# Patient Record
Sex: MALE | Race: OTHER | NOT HISPANIC OR LATINO | URBAN - METROPOLITAN AREA
[De-identification: names, ages, dates, MRNs, and addresses within clinical notes are randomized per-mention and may not be internally consistent; named-entity substitution may affect disease eponyms.]

---

## 2017-10-15 ENCOUNTER — EMERGENCY (EMERGENCY)
Facility: HOSPITAL | Age: 47
LOS: 1 days | Discharge: HOME | End: 2017-10-15
Admitting: EMERGENCY MEDICINE
Payer: SELF-PAY

## 2017-10-15 VITALS
TEMPERATURE: 98 F | OXYGEN SATURATION: 99 % | DIASTOLIC BLOOD PRESSURE: 79 MMHG | SYSTOLIC BLOOD PRESSURE: 129 MMHG | HEART RATE: 78 BPM | RESPIRATION RATE: 18 BRPM

## 2017-10-15 PROCEDURE — 99284 EMERGENCY DEPT VISIT MOD MDM: CPT

## 2017-10-15 RX ORDER — DOCUSATE SODIUM 100 MG
1 CAPSULE ORAL
Qty: 14 | Refills: 0 | OUTPATIENT
Start: 2017-10-15 | End: 2017-10-22

## 2017-10-15 RX ORDER — HYDROCORTISONE/PRAMOXINE 2.5 %-1 %
1 CREAM WITH APPLICATOR RECTAL
Qty: 1 | Refills: 0 | OUTPATIENT
Start: 2017-10-15 | End: 2017-10-22

## 2017-10-15 RX ORDER — IBUPROFEN 200 MG
600 TABLET ORAL ONCE
Qty: 0 | Refills: 0 | Status: COMPLETED | OUTPATIENT
Start: 2017-10-15 | End: 2017-10-15

## 2017-10-15 RX ORDER — IBUPROFEN 200 MG
1 TABLET ORAL
Qty: 20 | Refills: 0 | OUTPATIENT
Start: 2017-10-15 | End: 2017-10-20

## 2017-10-15 RX ADMIN — Medication 600 MILLIGRAM(S): at 14:37

## 2017-10-15 NOTE — ED PROVIDER NOTE - MEDICAL DECISION MAKING DETAILS
46 y/o M with rectal pain s/p BM last night likely internal hemorrhoid- Analpram, Colase, Sitza baths, NSAIDS, GI follow up updated colonoscopy

## 2017-10-15 NOTE — ED ADULT TRIAGE NOTE - CHIEF COMPLAINT QUOTE
Patient brought to ER from home after having c/o pain and swelling to anus. Pt also c/o abdominal pain and distention that started today.

## 2017-10-15 NOTE — ED PROVIDER NOTE - PLAN OF CARE
Follow up with your PMD within 48-72 hrs. Use the cream 2x/day for 7 days. Take Motrin 600mg every 6-8hrs as needed for pain with food. Take the Colace 1 pill 2x/day as needed to keep your stool soft. Take a warm sitz bath 3-4 times/day with epsom salt for pain relief. Follow up with the Gastroenterologist within the week- referral list given. Worsening, continued or ANY new concerning symptoms return to the emergency department.

## 2017-10-15 NOTE — ED PROVIDER NOTE - OBJECTIVE STATEMENT
46 y/o M with h/o anxiety and depression presents to ED with rectal "swelling" since last night. States he had a BM of normal consistency and then started with the pain right after. Slept fine throughout the night, did not take anything for the pain. Awoke in the am and had another BM of normal consistency that was now painful. States he felt a "swelling and something sticking out" right after.  Denies h/o hemorrhoids, constipation. Denies nausea, vomiting, fever, chills, weakness, dysuria, melena, BRBPR. Appears very well. No distress. Contrary to triage note, no abdominal pain.

## 2017-10-15 NOTE — ED PROVIDER NOTE - CARE PLAN
Principal Discharge DX:	Rectal pain  Instructions for follow-up, activity and diet:	Follow up with your PMD within 48-72 hrs. Use the cream 2x/day for 7 days. Take Motrin 600mg every 6-8hrs as needed for pain with food. Take the Colace 1 pill 2x/day as needed to keep your stool soft. Take a warm sitz bath 3-4 times/day with epsom salt for pain relief. Follow up with the Gastroenterologist within the week- referral list given. Worsening, continued or ANY new concerning symptoms return to the emergency department.

## 2018-10-22 ENCOUNTER — EMERGENCY (EMERGENCY)
Facility: HOSPITAL | Age: 48
LOS: 1 days | Discharge: HOME | End: 2018-10-22
Attending: EMERGENCY MEDICINE | Admitting: EMERGENCY MEDICINE
Payer: MEDICARE

## 2018-10-22 VITALS
RESPIRATION RATE: 76 BRPM | DIASTOLIC BLOOD PRESSURE: 82 MMHG | HEART RATE: 99 BPM | OXYGEN SATURATION: 100 % | SYSTOLIC BLOOD PRESSURE: 130 MMHG | TEMPERATURE: 98 F

## 2018-10-22 PROCEDURE — 99283 EMERGENCY DEPT VISIT LOW MDM: CPT | Mod: GC

## 2018-10-22 RX ORDER — ACETAMINOPHEN 500 MG
975 TABLET ORAL ONCE
Qty: 0 | Refills: 0 | Status: COMPLETED | OUTPATIENT
Start: 2018-10-22 | End: 2018-10-22

## 2018-10-22 RX ORDER — OXYCODONE HYDROCHLORIDE 5 MG/1
5 TABLET ORAL ONCE
Qty: 0 | Refills: 0 | Status: DISCONTINUED | OUTPATIENT
Start: 2018-10-22 | End: 2018-10-22

## 2018-10-22 RX ADMIN — Medication 975 MILLIGRAM(S): at 23:34

## 2018-10-22 RX ADMIN — OXYCODONE HYDROCHLORIDE 5 MILLIGRAM(S): 5 TABLET ORAL at 23:34

## 2018-10-22 NOTE — ED PROVIDER NOTE - PHYSICAL EXAMINATION
Mago Rice, DO:   Gen: Well appearing, NAD  Head: NCAT  HEENT: PERRL, MMM, normal conjunctiva, anicteric, neck supple  Lung: CTAB, no adventitious sounds  CV: RRR, no murmurs  Abd: soft, NTND, no rebound or guarding  MSK: L subacromial TTP without edema, erythema or warmth, ROM limited by pain but joint with pROM. Guarding LUE extremity with ROM.   Neuro: 5/5 strength and normal sensation intact L C5-T1.   Skin: Warm and dry, no evidence of rash  Psych: normal mood and affect

## 2018-10-22 NOTE — ED PROVIDER NOTE - OBJECTIVE STATEMENT
Mago Rice, DO: 48M with hx HTN, HLD BIBEMS for L shoulder pain x 2 months, acutely worse since today. Pain worse with L ROM. Pt seen at [facility] with negative w/u, d/c with Meloxicam & Flexeril. Patient stating no relief with medications. Denies fever, rash. No recent trauma. Denies IVDU.

## 2018-10-22 NOTE — ED PROVIDER NOTE - ATTENDING CONTRIBUTION TO CARE
Locurto  pt presenting with lt shoulder pain  worse with movement for 2 mos intermittently  but worse over the last 1-2 days  he denies trauma or identifiable acute insult  Pain is intensified with attempted movement    exam  pt with clear lungs  card  RRR S1S2  strength nl in RUE and LE's  LUE  strength normal at hand, wrists  and elbow including intrinsics of hand  movement at rt shoulder intensifies pain  pt with limited ability to abduct or flex  but shoulder can be ranged passively  shoulder tender laterally  no warmth or edema, distal pulses intact    plain films negative  suspect rotator cuff pathology with decreased movement and localizable tenderness   mostly indolent course  will provide pain relief  will need ortho follow up

## 2018-10-22 NOTE — ED PROVIDER NOTE - CARE PLAN
Principal Discharge DX:	Shoulder pain Principal Discharge DX:	Shoulder pain  Assessment and plan of treatment:	Continue taking meloxican as prescribed.  Take percocet 5/325mg every 6-8 hours as needed for breakthrough pain.  Follow up with orthopedic within one week.  Return to ED for any worsening pain, redness, swelling or fever.

## 2018-10-22 NOTE — ED PROVIDER NOTE - PLAN OF CARE
Continue taking meloxican as prescribed.  Take percocet 5/325mg every 6-8 hours as needed for breakthrough pain.  Follow up with orthopedic within one week.  Return to ED for any worsening pain, redness, swelling or fever.

## 2018-10-22 NOTE — ED ADULT TRIAGE NOTE - CHIEF COMPLAINT QUOTE
left arm pain today while sitting at home no trauma or injury. Denies CP, SOB. denies med hx or medications. Seen today as per patient at [facility] with negative xray and normal ekg as per patient. +pulse in extremity no obvious deformities. Ekg in progress, appears in NAD in ambulance bay. left arm pain starts from neck and states "I feel short of breath when the pain came." Breathing even and unlabored 100% on room air.  today while sitting at home no trauma or injury. Denies CP, SOB. denies med hx or medications. Seen today as per patient at [facility] with negative xray and normal ekg as per patient. +pulse in extremity no obvious deformities. Ekg in progress, appears in NAD in ambulance bay.    @3767 normal ekg as per signing MD will send to intake. left arm pain starts from neck and states "I feel short of breath when the pain came." Breathing even and unlabored 100% on room air.  today while sitting at home no trauma or injury. denies med hx or medications. Seen today as per patient at [facility] with negative xray and normal ekg as per patient. +pulse in extremity no obvious deformities. Ekg in progress, appears in NAD in ambulance bay.    @3907 normal ekg as per signing MD will send to intake.

## 2018-10-22 NOTE — ED PROVIDER NOTE - MEDICAL DECISION MAKING DETAILS
Mago Rice, DO: 48M with LUE pain at shoulder with decreased ROM. No fever, hx of RA or IVDU to put at risk for septic joint. possibly rotator cuff injury given chronicity of baseline symptoms with acute on chronic pain. Symptomatic relief with analgesia & f/u with Ortho.

## 2018-10-23 PROCEDURE — 73030 X-RAY EXAM OF SHOULDER: CPT | Mod: 26,LT

## 2018-10-29 ENCOUNTER — APPOINTMENT (OUTPATIENT)
Dept: ORTHOPEDIC SURGERY | Facility: CLINIC | Age: 48
End: 2018-10-29

## 2021-12-16 ENCOUNTER — EMERGENCY (EMERGENCY)
Facility: HOSPITAL | Age: 51
LOS: 1 days | Discharge: HOME | End: 2021-12-16
Attending: EMERGENCY MEDICINE | Admitting: EMERGENCY MEDICINE
Payer: MEDICARE

## 2021-12-16 VITALS
RESPIRATION RATE: 16 BRPM | DIASTOLIC BLOOD PRESSURE: 68 MMHG | HEART RATE: 78 BPM | OXYGEN SATURATION: 100 % | TEMPERATURE: 98 F | SYSTOLIC BLOOD PRESSURE: 106 MMHG

## 2021-12-16 VITALS
OXYGEN SATURATION: 100 % | HEIGHT: 65 IN | HEART RATE: 94 BPM | SYSTOLIC BLOOD PRESSURE: 128 MMHG | RESPIRATION RATE: 15 BRPM | TEMPERATURE: 98 F | DIASTOLIC BLOOD PRESSURE: 91 MMHG

## 2021-12-16 LAB
ALBUMIN SERPL ELPH-MCNC: 4.6 G/DL — SIGNIFICANT CHANGE UP (ref 3.3–5)
ALP SERPL-CCNC: 71 U/L — SIGNIFICANT CHANGE UP (ref 40–120)
ALT FLD-CCNC: 179 U/L — HIGH (ref 4–41)
ANION GAP SERPL CALC-SCNC: 9 MMOL/L — SIGNIFICANT CHANGE UP (ref 7–14)
APPEARANCE UR: CLEAR — SIGNIFICANT CHANGE UP
AST SERPL-CCNC: 96 U/L — HIGH (ref 4–40)
BASOPHILS # BLD AUTO: 0.03 K/UL — SIGNIFICANT CHANGE UP (ref 0–0.2)
BASOPHILS NFR BLD AUTO: 0.5 % — SIGNIFICANT CHANGE UP (ref 0–2)
BILIRUB SERPL-MCNC: 0.3 MG/DL — SIGNIFICANT CHANGE UP (ref 0.2–1.2)
BILIRUB UR-MCNC: NEGATIVE — SIGNIFICANT CHANGE UP
BUN SERPL-MCNC: 15 MG/DL — SIGNIFICANT CHANGE UP (ref 7–23)
CALCIUM SERPL-MCNC: 9.6 MG/DL — SIGNIFICANT CHANGE UP (ref 8.4–10.5)
CHLORIDE SERPL-SCNC: 104 MMOL/L — SIGNIFICANT CHANGE UP (ref 98–107)
CO2 SERPL-SCNC: 28 MMOL/L — SIGNIFICANT CHANGE UP (ref 22–31)
COLOR SPEC: YELLOW — SIGNIFICANT CHANGE UP
CREAT SERPL-MCNC: 0.87 MG/DL — SIGNIFICANT CHANGE UP (ref 0.5–1.3)
DIFF PNL FLD: NEGATIVE — SIGNIFICANT CHANGE UP
EOSINOPHIL # BLD AUTO: 0.11 K/UL — SIGNIFICANT CHANGE UP (ref 0–0.5)
EOSINOPHIL NFR BLD AUTO: 1.7 % — SIGNIFICANT CHANGE UP (ref 0–6)
GLUCOSE SERPL-MCNC: 145 MG/DL — HIGH (ref 70–99)
GLUCOSE UR QL: NEGATIVE — SIGNIFICANT CHANGE UP
HCT VFR BLD CALC: 42.5 % — SIGNIFICANT CHANGE UP (ref 39–50)
HGB BLD-MCNC: 14.5 G/DL — SIGNIFICANT CHANGE UP (ref 13–17)
IANC: 3.48 K/UL — SIGNIFICANT CHANGE UP (ref 1.5–8.5)
IMM GRANULOCYTES NFR BLD AUTO: 0.3 % — SIGNIFICANT CHANGE UP (ref 0–1.5)
KETONES UR-MCNC: NEGATIVE — SIGNIFICANT CHANGE UP
LEUKOCYTE ESTERASE UR-ACNC: NEGATIVE — SIGNIFICANT CHANGE UP
LYMPHOCYTES # BLD AUTO: 2.23 K/UL — SIGNIFICANT CHANGE UP (ref 1–3.3)
LYMPHOCYTES # BLD AUTO: 34.3 % — SIGNIFICANT CHANGE UP (ref 13–44)
MAGNESIUM SERPL-MCNC: 2.1 MG/DL — SIGNIFICANT CHANGE UP (ref 1.6–2.6)
MCHC RBC-ENTMCNC: 29.2 PG — SIGNIFICANT CHANGE UP (ref 27–34)
MCHC RBC-ENTMCNC: 34.1 GM/DL — SIGNIFICANT CHANGE UP (ref 32–36)
MCV RBC AUTO: 85.7 FL — SIGNIFICANT CHANGE UP (ref 80–100)
MONOCYTES # BLD AUTO: 0.63 K/UL — SIGNIFICANT CHANGE UP (ref 0–0.9)
MONOCYTES NFR BLD AUTO: 9.7 % — SIGNIFICANT CHANGE UP (ref 2–14)
NEUTROPHILS # BLD AUTO: 3.48 K/UL — SIGNIFICANT CHANGE UP (ref 1.8–7.4)
NEUTROPHILS NFR BLD AUTO: 53.5 % — SIGNIFICANT CHANGE UP (ref 43–77)
NITRITE UR-MCNC: NEGATIVE — SIGNIFICANT CHANGE UP
NRBC # BLD: 0 /100 WBCS — SIGNIFICANT CHANGE UP
NRBC # FLD: 0 K/UL — SIGNIFICANT CHANGE UP
OB PNL STL: NEGATIVE — SIGNIFICANT CHANGE UP
PH UR: 5 — SIGNIFICANT CHANGE UP (ref 5–8)
PHOSPHATE SERPL-MCNC: 3 MG/DL — SIGNIFICANT CHANGE UP (ref 2.5–4.5)
PLATELET # BLD AUTO: 199 K/UL — SIGNIFICANT CHANGE UP (ref 150–400)
POTASSIUM SERPL-MCNC: 4.5 MMOL/L — SIGNIFICANT CHANGE UP (ref 3.5–5.3)
POTASSIUM SERPL-SCNC: 4.5 MMOL/L — SIGNIFICANT CHANGE UP (ref 3.5–5.3)
PROT SERPL-MCNC: 7.3 G/DL — SIGNIFICANT CHANGE UP (ref 6–8.3)
PROT UR-MCNC: NEGATIVE — SIGNIFICANT CHANGE UP
RBC # BLD: 4.96 M/UL — SIGNIFICANT CHANGE UP (ref 4.2–5.8)
RBC # FLD: 13.7 % — SIGNIFICANT CHANGE UP (ref 10.3–14.5)
SARS-COV-2 RNA SPEC QL NAA+PROBE: SIGNIFICANT CHANGE UP
SODIUM SERPL-SCNC: 141 MMOL/L — SIGNIFICANT CHANGE UP (ref 135–145)
SP GR SPEC: 1.02 — SIGNIFICANT CHANGE UP (ref 1–1.05)
TROPONIN T, HIGH SENSITIVITY RESULT: <6 NG/L — SIGNIFICANT CHANGE UP
UROBILINOGEN FLD QL: SIGNIFICANT CHANGE UP
WBC # BLD: 6.5 K/UL — SIGNIFICANT CHANGE UP (ref 3.8–10.5)
WBC # FLD AUTO: 6.5 K/UL — SIGNIFICANT CHANGE UP (ref 3.8–10.5)

## 2021-12-16 PROCEDURE — 71045 X-RAY EXAM CHEST 1 VIEW: CPT | Mod: 26

## 2021-12-16 PROCEDURE — 99285 EMERGENCY DEPT VISIT HI MDM: CPT | Mod: 25

## 2021-12-16 PROCEDURE — 93010 ELECTROCARDIOGRAM REPORT: CPT

## 2021-12-16 RX ORDER — METOCLOPRAMIDE HCL 10 MG
10 TABLET ORAL ONCE
Refills: 0 | Status: COMPLETED | OUTPATIENT
Start: 2021-12-16 | End: 2021-12-16

## 2021-12-16 RX ADMIN — Medication 10 MILLIGRAM(S): at 09:36

## 2021-12-16 NOTE — ED PROVIDER NOTE - CLINICAL SUMMARY MEDICAL DECISION MAKING FREE TEXT BOX
51m hx depression here with diffuse pain, HA, neck pain, chest pain, abd pain, leg pain. On exam resting comfortably, nontender, no trauma, no meningieal signs, CN intact, 5/5 all extremities, no ataxia, PERRL, CTAB, RRR, abd SNT, full ROM of extremities, no CVAT. Unclear etiology, no localizing sx or exam findings, will check trop for cp with TWI, cxr, covid swab, reglan, re eval, likely dc if negative. No suspicion for meningitis, no suspicion for appy/louise without tenderness and with duration, no cvat or hematuria to suggest stone.

## 2021-12-16 NOTE — ED PROVIDER NOTE - NSFOLLOWUPINSTRUCTIONS_ED_ALL_ED_FT
You came to the ER with pain and we evaluated you with labs, an ekg, an xray and did not find any cause. Your results are attached. You are stable for discharge. Follow up with your PCP within 1-2 weeks for further evaluation and treatment.     You can take TYLENOL/ACETAMINOPHEN up to 4,000mg a day for your symptoms in four divided doses.     You can take MOTRIN/IBUPROFEN up to 2,400mg a day in four divided doses.     Return to the ER for new fevers, worsening symptoms, passing out, difficulty breathing, or for any concern you would like evaluated.

## 2021-12-16 NOTE — ED PROVIDER NOTE - ATTENDING CONTRIBUTION TO CARE
Attending Statement: I have personally seen and examined this patient. I have fully participated in the care of this patient. I have reviewed all pertinent clinical information, including history physical exam, plan and the Resident's note and agree except as noted  51yoM hx of depression, HTN, HLD, cervical herniated disc pw headache, and chest pain on/off for one week. Dull frontal headache w nausea but no vomit. no neck pain. no photophobia no fever. chest pain squeezing sensation, non exertional/non pleuritic. no associated SOB/HERNANDEZ or cough. no fever. Vaccinated for covid.  no travel. no numbness or weakness.   Vital signs noted.  laying down. EOMI. PERRLA. no facial asymmetry no slurred speech. supple neck. normal S1-S2 No resp distress. able to speak in full and clear sentences. no wheeze, rales or stridor. soft nontender abdomen. no  rebound. no guarding. no sign of trauma. no CVAT no focal deficits.   plan labs, ekg, cxr, pain med

## 2021-12-16 NOTE — ED PROVIDER NOTE - NSICDXPASTMEDICALHX_GEN_ALL_CORE_FT
PAST MEDICAL HISTORY:  Benign essential hypertension     Cervical herniated disc     Dyslipidemia     Hyperglycemia

## 2021-12-16 NOTE — ED PROVIDER NOTE - PATIENT PORTAL LINK FT
You can access the FollowMyHealth Patient Portal offered by [facility] by registering at the following website: [email or web address]. By joining [employer]’s FollowMyHealth portal, you will also be able to view your health information using other applications (apps) compatible with our system.

## 2021-12-16 NOTE — ED PROVIDER NOTE - OBJECTIVE STATEMENT
52 y/o M pmh depression, here with diffuse pain x1 week. Began as gradual all over headache which moved down back of neck, back, and into chest, then down abdomen and legs. Has not had similar before. Subjective fevers without documented temp. no recent travel, vaccinated covid and flu, no known sick contacts. No vision changes or neck stiffness. CP is constant, squeezing, not worse with ambulation. Abd pain also constant, diffuse, unable to be localized. Able to ambulate. No leg swelling or hx of dvt/pe.

## 2021-12-16 NOTE — ED PROVIDER NOTE - PHYSICAL EXAMINATION
Vitals: I have reviewed the patients vital signs  General: Well dressed, well appearing, no acute distress  HEENT: Atraumatic, normocephalic, airway patent  Eyes: EOMI, tracking appropriately  Neck: no tracheal deviation, no JVD, supple  Chest/Lungs: no trauma, symmetric chest rise, speaking in complete sentences, no WOB, CTAB  Heart: skin and extremities well perfused, regular rate and rhythm, 2+ pulses  Abd: soft, nontender, no peritoneal signs  Neuro: A+Ox3, ambulating without difficulty, CN intact, no dysmetria, no ataxia  MSK: strength at baseline in all extremities, no muscle wasting or atrophy  Skin: no cyanosis, no jaundice, no new emergent lesions

## 2021-12-16 NOTE — ED ADULT TRIAGE NOTE - CHIEF COMPLAINT QUOTE
Pt. c/o neck pain radiating to the lower back and b/l knees x1 week. Endorses intermittent SOB, fevers and chills. PHx Depression on zoloft and Seroquel.

## 2021-12-16 NOTE — ED ADULT TRIAGE NOTE - MODE OF ARRIVAL
Spoke with pt, advised he will need to make and keep appt with PCP to get medication refill. Pt scheduled for OV on 2/21. Pt made aware to get regular refills of medication that pt will need to see MD for regular visits.  Sent refill for 30 day supply to pharmacy.   Walk in

## 2021-12-16 NOTE — ED PROVIDER NOTE - PROGRESS NOTE DETAILS
Brian: labs and imaging reassuring. Results explained, printed and given to patient, patient given discharge instructions and information for follow up and return precautions.

## 2022-07-15 ENCOUNTER — EMERGENCY (EMERGENCY)
Facility: HOSPITAL | Age: 52
LOS: 1 days | Discharge: HOME | End: 2022-07-15
Attending: STUDENT IN AN ORGANIZED HEALTH CARE EDUCATION/TRAINING PROGRAM | Admitting: STUDENT IN AN ORGANIZED HEALTH CARE EDUCATION/TRAINING PROGRAM

## 2022-07-15 VITALS
HEIGHT: 65 IN | DIASTOLIC BLOOD PRESSURE: 80 MMHG | TEMPERATURE: 97 F | HEART RATE: 95 BPM | RESPIRATION RATE: 17 BRPM | OXYGEN SATURATION: 99 % | SYSTOLIC BLOOD PRESSURE: 128 MMHG

## 2022-07-15 PROCEDURE — 99284 EMERGENCY DEPT VISIT MOD MDM: CPT

## 2022-07-15 RX ORDER — LIDOCAINE 4 G/100G
1 CREAM TOPICAL ONCE
Refills: 0 | Status: COMPLETED | OUTPATIENT
Start: 2022-07-15 | End: 2022-07-15

## 2022-07-15 RX ORDER — TRIAMCINOLONE 4 MG
40 TABLET ORAL ONCE
Refills: 0 | Status: DISCONTINUED | OUTPATIENT
Start: 2022-07-15 | End: 2022-07-19

## 2022-07-15 RX ORDER — BUPIVACAINE HCL/PF 7.5 MG/ML
10 VIAL (ML) INJECTION ONCE
Refills: 0 | Status: DISCONTINUED | OUTPATIENT
Start: 2022-07-15 | End: 2022-07-19

## 2022-07-15 RX ORDER — IBUPROFEN 200 MG
600 TABLET ORAL ONCE
Refills: 0 | Status: COMPLETED | OUTPATIENT
Start: 2022-07-15 | End: 2022-07-15

## 2022-07-15 RX ORDER — ACETAMINOPHEN 500 MG
1000 TABLET ORAL ONCE
Refills: 0 | Status: COMPLETED | OUTPATIENT
Start: 2022-07-15 | End: 2022-07-15

## 2022-07-15 RX ADMIN — LIDOCAINE 1 PATCH: 4 CREAM TOPICAL at 18:11

## 2022-07-15 RX ADMIN — Medication 600 MILLIGRAM(S): at 18:10

## 2022-07-15 RX ADMIN — Medication 1000 MILLIGRAM(S): at 18:10

## 2022-07-15 NOTE — ED ADULT TRIAGE NOTE - CHIEF COMPLAINT QUOTE
Pt ambulatory c/o lower back pain radiating to b/l feet since 3 days ago. Denies any injuries, swelling. Also c/o worsening rash on b/l forearm and feet. Denies throat tightness, difficulty breathing. No drooling noted. Denies pmhx.

## 2022-07-15 NOTE — ED PROVIDER NOTE - SKIN, MLM
Skin normal color for race, warm, dry and intact. Rash is present, evidence of dyshidrosis on the arms and legs BL

## 2022-07-15 NOTE — ED ADULT NURSE NOTE - NS PRO AD NO ADVANCE DIRECTIVE
Called pt to schedule appt pt has appt scheduled for 10/27/18@1:00 in [place] with Dr. Goff. They will keep that appt.   No

## 2022-07-15 NOTE — ED PROVIDER NOTE - ATTENDING CONTRIBUTION TO CARE
I (Amelia) agree with above, I performed a history and physical. Counseled jd medical staff, physician assistant, and/or medical student on medical decision making as documented. Medical decisions and treatment interventions were made in real time during the patient encounter. Additionally and/or with the following exceptions: The patient presented to the ED with back pain as above, no trauma, no saddle anesthesia, urinary retention, weakness, numbness, felt better with meds and trigger point injections. will follow up with spine. Return precautions reviewed. Patient verbalized understand of conditions for return and plan for follow up. Patient was instructed to utilize [phone] to obtain follow up as indicated.

## 2022-07-15 NOTE — ED PROVIDER NOTE - PROGRESS NOTE DETAILS
The patient was given medication for pain and states the pain has not improved yet. The patient will be given a trigger point injection and evaluated for pain relief.     So Mcneil, DO PGY-1 The patient states his pain did not improve. 4 trigger point injections were given in the lower back. The patient will be evaluated for pain relief.     So Mcneil, DO PGY-1 The patient pain improved from an 8/10 to a 6/10. The patient was instructed on medication to take following discharge. The patient was instructed to return if symptoms become more severe or if he began to experience any alarm symptoms.     So Mcneil, DO PGY-1

## 2022-07-15 NOTE — ED PROVIDER NOTE - NEUROLOGICAL, MLM
Please let him know his LDL was elevated, we started Zetia yesterday. We will recheck his lipid panel in 6 weeks. Alert and oriented, no focal deficits, no motor or sensory deficits.

## 2022-07-15 NOTE — ED PROVIDER NOTE - NSFOLLOWUPINSTRUCTIONS_ED_ALL_ED_FT
take acetaminophen 650 mg every 6 hours and ibuprofen 400 mg every 6 hours with food. Both of these medications work differently to treat pain and inflammation.     Back Pain    WHAT YOU NEED TO KNOW:    Back pain is common. You may have back pain and muscle spasms. You may feel sore or stiff on one or both sides of your back. The pain may spread to your lower body. Conditions that affect the spine, joints, or muscles can cause back pain. These may include arthritis, spinal stenosis (narrowing of the spinal column), muscle tension, or breakdown of the spinal discs.    DISCHARGE INSTRUCTIONS:    Call your local emergency number (911 in the ) if:    You have severe back pain with chest pain.    You cannot control your urine or bowel movements.    Your pain becomes so severe that you cannot walk.  Return to the emergency department if:    You have pain, numbness, or weakness in one or both legs.    You have severe back pain, nausea, and vomiting.    You have severe back pain that spreads to your side or genital area.  Call your doctor if:    You have back pain that does not get better with rest and pain medicine.    You have a fever.    You have pain that worsens when you are on your back or when you rest.    You have pain that worsens when you cough or sneeze.    You lose weight without trying.    You have questions or concerns about your condition or care.  Medicines: You may need any of the following:    NSAIDs, such as ibuprofen, help decrease swelling, pain, and fever. This medicine is available with or without a doctor's order. NSAIDs can cause stomach bleeding or kidney problems in certain people. If you take blood thinner medicine, always ask your healthcare provider if NSAIDs are safe for you. Always read the medicine label and follow directions.    Acetaminophen decreases pain and fever. It is available without a doctor's order. Ask how much to take and how often to take it. Follow directions. Read the labels of all other medicines you are using to see if they also contain acetaminophen, or ask your doctor or pharmacist. Acetaminophen can cause liver damage if not taken correctly. Do not use more than 4 grams (4,000 milligrams) total of acetaminophen in one day.    Muscle relaxers help decrease muscle spasms and back pain.    Prescription pain medicine may be given. Ask your healthcare provider how to take this medicine safely. Some prescription pain medicines contain acetaminophen. Do not take other medicines that contain acetaminophen without talking to your healthcare provider. Too much acetaminophen may cause liver damage. Prescription pain medicine may cause constipation. Ask your healthcare provider how to prevent or treat constipation.    Take your medicine as directed. Contact your healthcare provider if you think your medicine is not helping or if you have side effects. Tell him or her if you are allergic to any medicine. Keep a list of the medicines, vitamins, and herbs you take. Include the amounts, and when and why you take them. Bring the list or the pill bottles to follow-up visits. Carry your medicine list with you in case of an emergency.  How to manage your back pain:    Apply ice on your back for 15 to 20 minutes every hour or as directed. Use an ice pack, or put crushed ice in a plastic bag. Cover it with a towel before you apply it to your skin. Ice helps prevent tissue damage and decreases pain.    Apply heat on your back for 20 to 30 minutes every 2 hours for as many days as directed. Heat helps decrease pain and muscle spasms.    Stay active as much as you can without causing more pain. Bed rest could make your back pain worse. Avoid heavy lifting until your pain is gone.    Go to physical therapy as directed. A physical therapist can teach you exercises to help improve movement and strength, and to decrease pain.  Follow up with your doctor in 2 weeks, or as directed: You might need to see a specialist. Write down your questions so you remember to ask them during your visits.

## 2022-07-15 NOTE — ED PROVIDER NOTE - CLINICAL SUMMARY MEDICAL DECISION MAKING FREE TEXT BOX
The patient presented to the ED with lower back pain and shooting leg pain. Th patient denied any incontinence of stool or urinary retention, denied drug use and had no point tenderness on the spine, had no history of tobacco use and vitals were stable and on exam straight leg test was negative. The suspicion for cauda equina syndrome, epidural abscess, abdominal aortic aneurysm and disc dislocation were low. Based on the patients history there is suspicion of paraspinal muscle injury and bilateral sciatica. The patient was started on 600 mg of Ibuprofen and 1000 mg of Tylenol and was given a lidocaine patch. The patient will also be given a trigger point injection. The patient is likely to be discharged today with pain medication. The patient presented to the ED with lower back pain and shooting leg pain. Th patient denied any incontinence of stool or urinary retention, denied drug use and had no point tenderness on the spine, had no history of tobacco use and vitals were stable and on exam straight leg test was negative. The suspicion for cauda equina syndrome, epidural abscess, abdominal aortic aneurysm and disc herniation were low. Based on the patients history there is suspicion of paraspinal muscle injury and bilateral sciatica. The patient was started on 600 mg of Ibuprofen and 1000 mg of Tylenol and was given a lidocaine patch. The patient will also be given a trigger point injection. The patient is likely to be discharged today with pain medication.

## 2022-07-15 NOTE — ED PROVIDER NOTE - OBJECTIVE STATEMENT
51 y/o male with no significant PMHx is presenting with a CC of lower back pain and feet pain. The patient states that the pain began 3 days ago and can not walk, sleep, or sit. The pain radiates down the back to the legs bilaterally specifically to the calf region. The patient also states that he has a rash that started 1 week ago and is getting worse. He states the he tried 600 mg of ibuprofen with no relief. The patient denies any recent exposures or trauma to the region. ROS was positive for HA, shooting pain in the legs, and numbness and tingling BL on both hands. The patient denied any nausea, vomiting, fevers, chills, urinary retention or incontinence of stool.

## 2022-07-15 NOTE — ED ADULT NURSE NOTE - OBJECTIVE STATEMENT
Patient is a 53 yo male, h/x depression, presenting with L lower back and leg pain x 4 days. AAOx4, no signs of distress, denies numbness/tingling, weakness, able to walk. Also c/o rash to arms and legs x 1 week, rash appears red without any drainage. Denies fevers, chills, urinary symptoms. Medicated for pain. Fall precautions maintained.

## 2022-08-04 NOTE — ED PROVIDER NOTE - CONSTITUTIONAL NEGATIVE STATEMENT, MLM
no fever and no chills. Patient with one or more new problems requiring additional work-up/treatment.

## 2023-06-05 ENCOUNTER — EMERGENCY (EMERGENCY)
Facility: HOSPITAL | Age: 53
LOS: 1 days | Discharge: HOME | End: 2023-06-05
Attending: STUDENT IN AN ORGANIZED HEALTH CARE EDUCATION/TRAINING PROGRAM | Admitting: STUDENT IN AN ORGANIZED HEALTH CARE EDUCATION/TRAINING PROGRAM
Payer: MEDICARE

## 2023-06-05 VITALS
DIASTOLIC BLOOD PRESSURE: 79 MMHG | SYSTOLIC BLOOD PRESSURE: 116 MMHG | OXYGEN SATURATION: 100 % | HEART RATE: 67 BPM | TEMPERATURE: 98 F | RESPIRATION RATE: 18 BRPM

## 2023-06-05 VITALS
TEMPERATURE: 98 F | DIASTOLIC BLOOD PRESSURE: 100 MMHG | SYSTOLIC BLOOD PRESSURE: 150 MMHG | RESPIRATION RATE: 18 BRPM | HEART RATE: 85 BPM | OXYGEN SATURATION: 98 %

## 2023-06-05 LAB
ALBUMIN SERPL ELPH-MCNC: 4.4 G/DL — SIGNIFICANT CHANGE UP (ref 3.3–5)
ALP SERPL-CCNC: 75 U/L — SIGNIFICANT CHANGE UP (ref 40–120)
ALT FLD-CCNC: 140 U/L — HIGH (ref 4–41)
ANION GAP SERPL CALC-SCNC: 12 MMOL/L — SIGNIFICANT CHANGE UP (ref 7–14)
APPEARANCE UR: CLEAR — SIGNIFICANT CHANGE UP
AST SERPL-CCNC: 78 U/L — HIGH (ref 4–40)
BASOPHILS # BLD AUTO: 0.04 K/UL — SIGNIFICANT CHANGE UP (ref 0–0.2)
BASOPHILS NFR BLD AUTO: 0.6 % — SIGNIFICANT CHANGE UP (ref 0–2)
BILIRUB SERPL-MCNC: 0.4 MG/DL — SIGNIFICANT CHANGE UP (ref 0.2–1.2)
BILIRUB UR-MCNC: NEGATIVE — SIGNIFICANT CHANGE UP
BUN SERPL-MCNC: 14 MG/DL — SIGNIFICANT CHANGE UP (ref 7–23)
CALCIUM SERPL-MCNC: 9.6 MG/DL — SIGNIFICANT CHANGE UP (ref 8.4–10.5)
CHLORIDE SERPL-SCNC: 103 MMOL/L — SIGNIFICANT CHANGE UP (ref 98–107)
CO2 SERPL-SCNC: 25 MMOL/L — SIGNIFICANT CHANGE UP (ref 22–31)
COLOR SPEC: SIGNIFICANT CHANGE UP
CREAT SERPL-MCNC: 0.84 MG/DL — SIGNIFICANT CHANGE UP (ref 0.5–1.3)
DIFF PNL FLD: NEGATIVE — SIGNIFICANT CHANGE UP
EGFR: 106 ML/MIN/1.73M2 — SIGNIFICANT CHANGE UP
EOSINOPHIL # BLD AUTO: 0.16 K/UL — SIGNIFICANT CHANGE UP (ref 0–0.5)
EOSINOPHIL NFR BLD AUTO: 2.6 % — SIGNIFICANT CHANGE UP (ref 0–6)
GLUCOSE SERPL-MCNC: 147 MG/DL — HIGH (ref 70–99)
GLUCOSE UR QL: NEGATIVE — SIGNIFICANT CHANGE UP
HCT VFR BLD CALC: 41.5 % — SIGNIFICANT CHANGE UP (ref 39–50)
HGB BLD-MCNC: 13.6 G/DL — SIGNIFICANT CHANGE UP (ref 13–17)
IANC: 3.15 K/UL — SIGNIFICANT CHANGE UP (ref 1.8–7.4)
IMM GRANULOCYTES NFR BLD AUTO: 0.3 % — SIGNIFICANT CHANGE UP (ref 0–0.9)
KETONES UR-MCNC: NEGATIVE — SIGNIFICANT CHANGE UP
LEUKOCYTE ESTERASE UR-ACNC: NEGATIVE — SIGNIFICANT CHANGE UP
LIDOCAIN IGE QN: 166 U/L — HIGH (ref 7–60)
LYMPHOCYTES # BLD AUTO: 2.26 K/UL — SIGNIFICANT CHANGE UP (ref 1–3.3)
LYMPHOCYTES # BLD AUTO: 36.3 % — SIGNIFICANT CHANGE UP (ref 13–44)
MCHC RBC-ENTMCNC: 27.9 PG — SIGNIFICANT CHANGE UP (ref 27–34)
MCHC RBC-ENTMCNC: 32.8 GM/DL — SIGNIFICANT CHANGE UP (ref 32–36)
MCV RBC AUTO: 85 FL — SIGNIFICANT CHANGE UP (ref 80–100)
MONOCYTES # BLD AUTO: 0.6 K/UL — SIGNIFICANT CHANGE UP (ref 0–0.9)
MONOCYTES NFR BLD AUTO: 9.6 % — SIGNIFICANT CHANGE UP (ref 2–14)
NEUTROPHILS # BLD AUTO: 3.15 K/UL — SIGNIFICANT CHANGE UP (ref 1.8–7.4)
NEUTROPHILS NFR BLD AUTO: 50.6 % — SIGNIFICANT CHANGE UP (ref 43–77)
NITRITE UR-MCNC: NEGATIVE — SIGNIFICANT CHANGE UP
NRBC # BLD: 0 /100 WBCS — SIGNIFICANT CHANGE UP (ref 0–0)
NRBC # FLD: 0 K/UL — SIGNIFICANT CHANGE UP (ref 0–0)
PH UR: 5.5 — SIGNIFICANT CHANGE UP (ref 5–8)
PLATELET # BLD AUTO: 209 K/UL — SIGNIFICANT CHANGE UP (ref 150–400)
POTASSIUM SERPL-MCNC: 4.6 MMOL/L — SIGNIFICANT CHANGE UP (ref 3.5–5.3)
POTASSIUM SERPL-SCNC: 4.6 MMOL/L — SIGNIFICANT CHANGE UP (ref 3.5–5.3)
PROT SERPL-MCNC: 6.6 G/DL — SIGNIFICANT CHANGE UP (ref 6–8.3)
PROT UR-MCNC: ABNORMAL
RBC # BLD: 4.88 M/UL — SIGNIFICANT CHANGE UP (ref 4.2–5.8)
RBC # FLD: 13.6 % — SIGNIFICANT CHANGE UP (ref 10.3–14.5)
SODIUM SERPL-SCNC: 140 MMOL/L — SIGNIFICANT CHANGE UP (ref 135–145)
SP GR SPEC: 1.03 — SIGNIFICANT CHANGE UP (ref 1.01–1.05)
UROBILINOGEN FLD QL: SIGNIFICANT CHANGE UP
WBC # BLD: 6.23 K/UL — SIGNIFICANT CHANGE UP (ref 3.8–10.5)
WBC # FLD AUTO: 6.23 K/UL — SIGNIFICANT CHANGE UP (ref 3.8–10.5)

## 2023-06-05 PROCEDURE — 74177 CT ABD & PELVIS W/CONTRAST: CPT | Mod: 26,ME

## 2023-06-05 PROCEDURE — 99284 EMERGENCY DEPT VISIT MOD MDM: CPT

## 2023-06-05 PROCEDURE — 93010 ELECTROCARDIOGRAM REPORT: CPT

## 2023-06-05 PROCEDURE — G1004: CPT

## 2023-06-05 RX ORDER — ONDANSETRON 8 MG/1
4 TABLET, FILM COATED ORAL ONCE
Refills: 0 | Status: COMPLETED | OUTPATIENT
Start: 2023-06-05 | End: 2023-06-05

## 2023-06-05 RX ORDER — FAMOTIDINE 10 MG/ML
20 INJECTION INTRAVENOUS ONCE
Refills: 0 | Status: DISCONTINUED | OUTPATIENT
Start: 2023-06-05 | End: 2023-06-05

## 2023-06-05 RX ORDER — FAMOTIDINE 10 MG/ML
20 INJECTION INTRAVENOUS ONCE
Refills: 0 | Status: COMPLETED | OUTPATIENT
Start: 2023-06-05 | End: 2023-06-05

## 2023-06-05 RX ORDER — ONDANSETRON 8 MG/1
4 TABLET, FILM COATED ORAL ONCE
Refills: 0 | Status: DISCONTINUED | OUTPATIENT
Start: 2023-06-05 | End: 2023-06-05

## 2023-06-05 RX ORDER — ACETAMINOPHEN 500 MG
1000 TABLET ORAL ONCE
Refills: 0 | Status: COMPLETED | OUTPATIENT
Start: 2023-06-05 | End: 2023-06-05

## 2023-06-05 RX ORDER — SODIUM CHLORIDE 9 MG/ML
1000 INJECTION, SOLUTION INTRAVENOUS ONCE
Refills: 0 | Status: COMPLETED | OUTPATIENT
Start: 2023-06-05 | End: 2023-06-05

## 2023-06-05 RX ADMIN — ONDANSETRON 4 MILLIGRAM(S): 8 TABLET, FILM COATED ORAL at 09:24

## 2023-06-05 RX ADMIN — FAMOTIDINE 20 MILLIGRAM(S): 10 INJECTION INTRAVENOUS at 09:24

## 2023-06-05 RX ADMIN — Medication 400 MILLIGRAM(S): at 09:24

## 2023-06-05 RX ADMIN — SODIUM CHLORIDE 1000 MILLILITER(S): 9 INJECTION, SOLUTION INTRAVENOUS at 09:24

## 2023-06-05 RX ADMIN — Medication 30 MILLILITER(S): at 09:24

## 2023-06-05 NOTE — ED PROVIDER NOTE - ATTENDING CONTRIBUTION TO CARE
I (Amelia) agree with above, I performed a history and physical. Counseled jd medical staff, physician assistant, and/or medical student on medical decision making as documented. Medical decisions and treatment interventions were made in real time during the patient encounter. Additionally and/or with the following exceptions: Patient is a 52-year-old male who is presenting with abdominal pain that migrated from the umbilicus to the right lower quadrant.  Abdomen was soft nontender nondistended but given pain pattern CT imaging was performed which was negative for acute pathology.  CBC nonactionable, CMP with mildly elevated AST ALT of unclear clinical significance.  Patient was discharged follow-up PMD

## 2023-06-05 NOTE — ED PROVIDER NOTE - PHYSICAL EXAMINATION
Gen: No acute distress  HEENT: NCAT, EOMI, PERRLA,  mucous membranes moist  CV: RRR, S1S2  Resp: CTAB  GI: RLQ, LLQ tenderness. Abdomen soft, ND. No rebound, no guarding.  : No CVAT  Neuro: A&Ox3, no motor or sensory deficits, no focal deficits.   MSK: No gross abnormalities. No midline spinal tenderness.  Skin: Warm, dry intact. No rashes.

## 2023-06-05 NOTE — ED ADULT NURSE NOTE - OBJECTIVE STATEMENT
patient Alert & ox3,  c/o of epigastric pain and numbness to lips for few days, p.t s/p trip and fall 2 weeks ago c/o of back and abd pain as well.  pt . evaluated by MD. Placed 20g angiocath rt. AC., labs drawn and sent. Due meds given as per order. patient will be waiting for results, further evaluation and disposition.  made comfortable.will continue to monitor.

## 2023-06-05 NOTE — ED PROVIDER NOTE - PATIENT PORTAL LINK FT
You can access the FollowMyHealth Patient Portal offered by [facility] by registering at the following website: [email or web address]. By joining [employer]’s FollowMyHealth portal, you will also be able to view your health information using other applications (apps) compatible with our system.

## 2023-06-05 NOTE — ED ADULT TRIAGE NOTE - CHIEF COMPLAINT QUOTE
p.t living with DM type2, c/o of epigastric pain and numbness to lips for few days, p.t s/p trip and fall 2 weeks ago c/o of back and abd pain neda well

## 2023-06-05 NOTE — ED PROVIDER NOTE - OBJECTIVE STATEMENT
52-year-old man with history of hypertension, hyperlipidemia, diabetes, presents for evaluation of 1 week of intermittent abdominal pain.  He reports abdominal discomfort that was initially epigastric and has since radiated to the right lower quadrant. He reports ongoing nausea without vomiting.  Denies any fevers, chills, or known sick contacts.  He denies any prior abdominal surgeries.  He reports diminished appetite and p.o. intake secondary to worsening nausea with attempted food intake.  He reports an approximate 10 pound unintentional weight loss over the last 2 weeks.

## 2023-06-05 NOTE — ED PROVIDER NOTE - CLINICAL SUMMARY MEDICAL DECISION MAKING FREE TEXT BOX
52-year-old man with history of hypertension, hyperlipidemia, diabetes presents for evaluation of approximately 1 week of ongoing nausea, abdominal discomfort that was initially epigastric and since radiated to the right lower quadrant, with right lower quadrant tenderness on exam otherwise well-appearing with normal range vital signs at this time.  Given migration of pain to the right lower quadrant, plan for CT abdomen pelvis to rule out appendicitis.  Also evaluate basic labs, provide hydration, and symptomatic management.  Plan for likely discharge pending labs, imaging, reassessment.

## 2023-06-05 NOTE — ED ADULT NURSE NOTE - NSFALLRISKINTERV_ED_ALL_ED

## 2023-06-05 NOTE — ED PROVIDER NOTE - NSFOLLOWUPINSTRUCTIONS_ED_ALL_ED_FT
You were seen in the emergency department for nausea, abdominal pain.  His CT scan does not show signs of active infection at this time, but does show small hernia.  Follow-up with general surgery soon as possible, in the next 5 to 7 days.  Return to the emergency department if you develop worsening pain, difficulty eating or drinking due to constant nausea and vomiting, chest pain, difficulty breathing.

## 2023-06-06 LAB
CULTURE RESULTS: NO GROWTH — SIGNIFICANT CHANGE UP
SPECIMEN SOURCE: SIGNIFICANT CHANGE UP

## 2023-07-12 NOTE — ED ADULT NURSE REASSESSMENT NOTE - NS ED NURSE REASSESS COMMENT FT1
pt A&ox4, awake and alert, c/o BL leg pain. breathing is spontaneous and unlabored. pt denies chest pain and SOB. bed in lowest position, call bell within reach, will continue to monitor.
98

## 2023-10-20 ENCOUNTER — EMERGENCY (EMERGENCY)
Facility: HOSPITAL | Age: 53
LOS: 1 days | Discharge: HOME | End: 2023-10-20
Attending: STUDENT IN AN ORGANIZED HEALTH CARE EDUCATION/TRAINING PROGRAM | Admitting: EMERGENCY MEDICINE
Payer: MEDICARE

## 2023-10-20 VITALS
RESPIRATION RATE: 16 BRPM | HEART RATE: 87 BPM | DIASTOLIC BLOOD PRESSURE: 87 MMHG | SYSTOLIC BLOOD PRESSURE: 133 MMHG | OXYGEN SATURATION: 100 % | TEMPERATURE: 98 F

## 2023-10-20 VITALS
SYSTOLIC BLOOD PRESSURE: 133 MMHG | RESPIRATION RATE: 16 BRPM | DIASTOLIC BLOOD PRESSURE: 83 MMHG | HEART RATE: 80 BPM | OXYGEN SATURATION: 97 %

## 2023-10-20 PROCEDURE — 99284 EMERGENCY DEPT VISIT MOD MDM: CPT

## 2023-10-20 PROCEDURE — 72020 X-RAY EXAM OF SPINE 1 VIEW: CPT | Mod: 26

## 2023-10-20 PROCEDURE — 72100 X-RAY EXAM L-S SPINE 2/3 VWS: CPT | Mod: 26

## 2023-10-20 RX ORDER — ACETAMINOPHEN 500 MG
975 TABLET ORAL ONCE
Refills: 0 | Status: COMPLETED | OUTPATIENT
Start: 2023-10-20 | End: 2023-10-20

## 2023-10-20 RX ORDER — CYCLOBENZAPRINE HYDROCHLORIDE 10 MG/1
10 TABLET, FILM COATED ORAL ONCE
Refills: 0 | Status: COMPLETED | OUTPATIENT
Start: 2023-10-20 | End: 2023-10-20

## 2023-10-20 RX ORDER — KETOROLAC TROMETHAMINE 30 MG/ML
15 SYRINGE (ML) INJECTION ONCE
Refills: 0 | Status: DISCONTINUED | OUTPATIENT
Start: 2023-10-20 | End: 2023-10-20

## 2023-10-20 RX ADMIN — Medication 15 MILLIGRAM(S): at 18:11

## 2023-10-20 RX ADMIN — CYCLOBENZAPRINE HYDROCHLORIDE 10 MILLIGRAM(S): 10 TABLET, FILM COATED ORAL at 18:55

## 2023-10-20 RX ADMIN — Medication 975 MILLIGRAM(S): at 18:11

## 2023-10-20 NOTE — ED PROVIDER NOTE - NSFOLLOWUPINSTRUCTIONS_ED_ALL_ED_FT
You were seen in the emergency department for back pain and abdominal pain.  We took x-rays of your spine and did not see any fractures or abnormalities.  Your abdominal pain may be related to gastric reflux.  So we gave you a referral for a gastroenterologist.  You can follow-up with the orthopedic surgeons to further discuss your back pain.  Please continue to take over-the-counter Tylenol and ibuprofen as needed for pain.  Please continue to follow-up with the surgeons at [facility] regarding your umbilical hernia.    If you start to have any severe abdominal pain, with nausea and vomiting making unable to eat food or medications, then please return to the emergency department.  If you start to have numbness or tingling in one arm or leg, any facial droop, any severe headaches, any vision abnormalities, or any fevers or chills, then please return to the emergency department.
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
## 2023-10-20 NOTE — ED PROVIDER NOTE - ATTENDING CONTRIBUTION TO CARE
53M p/w back pain and upper abd pain. Pt fell from standing onto tile floor ~2months ago and symptoms begin approximately 2 weeks later. Symptoms initially abd pain, had CT at that time which showed non-incarcerated hernia. States abd pain mostly improved but now with 2 wks of generalized back pain. Denies new falls/trauma. No LE weakness, numbness, tingling. No urinary/bowel symptoms. States pain has prevented him from sleeping. Otherwise denies any recent fevers or illnesses, nausea or vomiting, cough or chest pain, diarrhea, constipation, any weakness or numbness, any pain radiating into any of his limbs, any bowel or bladder incontinence.  Gen: non-toxic appearing, nad  CV: rrr, no appreciable murmur  Pulm: clear lungs  Abd: soft, ntnd  MSK: thoracic spinal/paraspinal tenderness, moving all extremites  Neuro: CN 2-12 grossly intact, motor 5/5 all extremities, sensation grossly intact, weight bearing and ambulatory  MDM: 53M p/w back pain and upper abd pain. Fall 2 months ago, unlikely that any fracture is contirbuting to symptoms, however given point tenderness will get XRay T and L spine to assess. Abd exam benign. No indication for repeat imaging. Will provide supportive care with tylenol, motrin and muscle relaxer.

## 2023-10-20 NOTE — ED PROVIDER NOTE - PATIENT PORTAL LINK FT
You can access the FollowMyHealth Patient Portal offered by [facility] by registering at the following website: [email or web address]. By joining [facility]’s FollowMyHealth portal, you will also be able to view your health information using other applications (apps) compatible with our system.
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
## 2023-10-20 NOTE — ED ADULT TRIAGE NOTE - CHIEF COMPLAINT QUOTE
pt c/o 2 months worsening abdominal pain, told had hernia, not requiring surgery, now pain is getting worse. pt had fall 2 months ago, seen and treated at [facility], now pain is worse and having difficulty sleeping/ sitting. past med hx: depression

## 2023-10-20 NOTE — ED ADULT NURSE NOTE - CHIEF COMPLAINT
----- Message from Arminda Raymundo MD sent at 3/1/2022  8:38 AM EST -----  Call pt potassium came down to normal  Fup with me for blood pressure check in 4 wks  
HUB RELAYED MESSAGE TO PATIENT ON 03/04/2022.   
Left voicemail for patient regarding these results/recommendations. OK per HIPAA Open telephone encounter left for reread by hub.    Hub please inform patient if call back:    Helbelinda!    Dr. Raymundo has received and reviewed your results. She states the following:    potassium came down to normal   Fup with me for blood pressure check in 4 wks           Please reach out to our office to schedule the needed follow up as stated.     Thanks!  DARYN Hicks    
The patient is a 53y Male complaining of abdominal pain.

## 2023-10-20 NOTE — ED PROVIDER NOTE - NSFOLLOWUPCLINICS_GEN_ALL_ED_FT
[facility] Orthopedic Surgery  Orthopedic Surgery  [address], [address]rd & 4th [place] [address]  Phone: [phone]  Fax:     Gastroenterology at [facility]  Gastroenterology  [address] [place] [address]  Phone: [phone]  Fax:

## 2023-10-20 NOTE — ED ADULT NURSE NOTE - OBJECTIVE STATEMENT
53 yom presents A&Ox4, c/o abdominal pain x2 months, states pain has been worsening. PHx abdominal hernia. Respirations even and unlabored. Pt denies any chest pain, sob, nausea, vomiting, diarrhea, recent falls/fever. Medication administered per order. Bed in lowest, safety maintained.

## 2023-10-20 NOTE — ED ADULT NURSE NOTE - NSFALLUNIVINTERV_ED_ALL_ED
Bed/Stretcher in lowest position, wheels locked, appropriate side rails in place/Call bell, personal items and telephone in reach/Instruct patient to call for assistance before getting out of bed/chair/stretcher/Non-slip footwear applied when patient is off stretcher/[place] to call system/Physically safe environment - no spills, clutter or unnecessary equipment/Purposeful proactive rounding/Room/bathroom lighting operational, light cord in reach

## 2023-10-20 NOTE — ED PROVIDER NOTE - PHYSICAL EXAMINATION
Constitutional: Well nourished, well developed, appears stated age.   Eyes: PERRL, EOMI. No scleral icterus  HENT: Moist mucous membranes. No posterior oropharyngeal erythema.   Neck: Supple. No goiter. No C-spine TTP. No meningismus  CV: RRR, no m/r/g. Well perfused extremities.   RESP: Lungs CTAB, normal respiratory effort  GI: Soft, non-tender, no masses appreciated  MSK: Moving all four extremities. No obvious deformity. Pain over the C7 midline spine, as well as the T4 spine area, Including the paraspinal musculature.  Skin: Warm, dry, no rashes  Neuro: Alert and oriented. CNII-XII grossly intact. Normal strength and sensation of UEs and LEs  Psych: Appropriate mood and affect

## 2023-11-15 ENCOUNTER — OUTPATIENT (OUTPATIENT)
Dept: OUTPATIENT SERVICES | Facility: HOSPITAL | Age: 53
LOS: 1 days | End: 2023-11-15
Payer: COMMERCIAL

## 2023-11-15 ENCOUNTER — APPOINTMENT (OUTPATIENT)
Dept: ORTHOPEDIC SURGERY | Facility: HOSPITAL | Age: 53
End: 2023-11-15

## 2023-11-15 VITALS
DIASTOLIC BLOOD PRESSURE: 79 MMHG | HEART RATE: 73 BPM | RESPIRATION RATE: 14 BRPM | BODY MASS INDEX: 33.57 KG/M2 | TEMPERATURE: 97 F | WEIGHT: 171 LBS | HEIGHT: 60 IN | SYSTOLIC BLOOD PRESSURE: 113 MMHG

## 2023-11-15 DIAGNOSIS — M79.10 MYALGIA, UNSPECIFIED SITE: ICD-10-CM

## 2023-11-15 DIAGNOSIS — M79.9 SOFT TISSUE DISORDER, UNSPECIFIED: ICD-10-CM

## 2023-11-15 PROCEDURE — G0463: CPT

## 2023-11-15 RX ORDER — DICLOFENAC SODIUM 1% 10 MG/G
1 GEL TOPICAL DAILY
Qty: 1 | Refills: 2 | Status: ACTIVE | COMMUNITY
Start: 2023-11-15 | End: 1900-01-01

## 2023-11-16 DIAGNOSIS — M79.10 MYALGIA, UNSPECIFIED SITE: ICD-10-CM

## 2024-01-09 ENCOUNTER — APPOINTMENT (OUTPATIENT)
Dept: GASTROENTEROLOGY | Facility: HOSPITAL | Age: 54
End: 2024-01-09
Payer: MEDICARE

## 2024-01-09 ENCOUNTER — OUTPATIENT (OUTPATIENT)
Dept: OUTPATIENT SERVICES | Facility: HOSPITAL | Age: 54
LOS: 1 days | End: 2024-01-09
Payer: COMMERCIAL

## 2024-01-09 VITALS
WEIGHT: 182 LBS | BODY MASS INDEX: 35.73 KG/M2 | DIASTOLIC BLOOD PRESSURE: 82 MMHG | RESPIRATION RATE: 14 BRPM | HEIGHT: 60 IN | HEART RATE: 96 BPM | SYSTOLIC BLOOD PRESSURE: 127 MMHG | TEMPERATURE: 97.2 F

## 2024-01-09 DIAGNOSIS — R74.01 ELEVATION OF LEVELS OF LIVER TRANSAMINASE LEVELS: ICD-10-CM

## 2024-01-09 DIAGNOSIS — Z78.9 OTHER SPECIFIED HEALTH STATUS: ICD-10-CM

## 2024-01-09 DIAGNOSIS — R07.9 CHEST PAIN, UNSPECIFIED: ICD-10-CM

## 2024-01-09 DIAGNOSIS — K92.1 MELENA: ICD-10-CM

## 2024-01-09 DIAGNOSIS — R10.9 UNSPECIFIED ABDOMINAL PAIN: ICD-10-CM

## 2024-01-09 DIAGNOSIS — Z82.49 FAMILY HISTORY OF ISCHEMIC HEART DISEASE AND OTHER DISEASES OF THE CIRCULATORY SYSTEM: ICD-10-CM

## 2024-01-09 DIAGNOSIS — Z87.898 PERSONAL HISTORY OF OTHER SPECIFIED CONDITIONS: ICD-10-CM

## 2024-01-09 DIAGNOSIS — E66.9 OBESITY, UNSPECIFIED: ICD-10-CM

## 2024-01-09 DIAGNOSIS — R10.13 EPIGASTRIC PAIN: ICD-10-CM

## 2024-01-09 LAB
ALBUMIN SERPL ELPH-MCNC: 4.7 G/DL — SIGNIFICANT CHANGE UP (ref 3.3–5)
ALBUMIN SERPL ELPH-MCNC: 4.7 G/DL — SIGNIFICANT CHANGE UP (ref 3.3–5)
ALP SERPL-CCNC: 114 U/L — SIGNIFICANT CHANGE UP (ref 40–120)
ALP SERPL-CCNC: 114 U/L — SIGNIFICANT CHANGE UP (ref 40–120)
ALT FLD-CCNC: 196 U/L — HIGH (ref 10–45)
ALT FLD-CCNC: 196 U/L — HIGH (ref 10–45)
ANION GAP SERPL CALC-SCNC: 15 MMOL/L — SIGNIFICANT CHANGE UP (ref 5–17)
ANION GAP SERPL CALC-SCNC: 15 MMOL/L — SIGNIFICANT CHANGE UP (ref 5–17)
AST SERPL-CCNC: 130 U/L — HIGH (ref 10–40)
AST SERPL-CCNC: 130 U/L — HIGH (ref 10–40)
BASOPHILS # BLD AUTO: 0.05 K/UL — SIGNIFICANT CHANGE UP (ref 0–0.2)
BASOPHILS # BLD AUTO: 0.05 K/UL — SIGNIFICANT CHANGE UP (ref 0–0.2)
BASOPHILS NFR BLD AUTO: 0.7 % — SIGNIFICANT CHANGE UP (ref 0–2)
BASOPHILS NFR BLD AUTO: 0.7 % — SIGNIFICANT CHANGE UP (ref 0–2)
BILIRUB SERPL-MCNC: 0.4 MG/DL — SIGNIFICANT CHANGE UP (ref 0.2–1.2)
BILIRUB SERPL-MCNC: 0.4 MG/DL — SIGNIFICANT CHANGE UP (ref 0.2–1.2)
BUN SERPL-MCNC: 18 MG/DL — SIGNIFICANT CHANGE UP (ref 7–23)
BUN SERPL-MCNC: 18 MG/DL — SIGNIFICANT CHANGE UP (ref 7–23)
CALCIUM SERPL-MCNC: 9.7 MG/DL — SIGNIFICANT CHANGE UP (ref 8.4–10.5)
CALCIUM SERPL-MCNC: 9.7 MG/DL — SIGNIFICANT CHANGE UP (ref 8.4–10.5)
CERULOPLASMIN SERPL-MCNC: 23 MG/DL — SIGNIFICANT CHANGE UP (ref 15–30)
CERULOPLASMIN SERPL-MCNC: 23 MG/DL — SIGNIFICANT CHANGE UP (ref 15–30)
CHLORIDE SERPL-SCNC: 100 MMOL/L — SIGNIFICANT CHANGE UP (ref 96–108)
CHLORIDE SERPL-SCNC: 100 MMOL/L — SIGNIFICANT CHANGE UP (ref 96–108)
CO2 SERPL-SCNC: 26 MMOL/L — SIGNIFICANT CHANGE UP (ref 22–31)
CO2 SERPL-SCNC: 26 MMOL/L — SIGNIFICANT CHANGE UP (ref 22–31)
CREAT SERPL-MCNC: 0.9 MG/DL — SIGNIFICANT CHANGE UP (ref 0.5–1.3)
CREAT SERPL-MCNC: 0.9 MG/DL — SIGNIFICANT CHANGE UP (ref 0.5–1.3)
EGFR: 103 ML/MIN/1.73M2 — SIGNIFICANT CHANGE UP
EGFR: 103 ML/MIN/1.73M2 — SIGNIFICANT CHANGE UP
EOSINOPHIL # BLD AUTO: 0.13 K/UL — SIGNIFICANT CHANGE UP (ref 0–0.5)
EOSINOPHIL # BLD AUTO: 0.13 K/UL — SIGNIFICANT CHANGE UP (ref 0–0.5)
EOSINOPHIL NFR BLD AUTO: 1.8 % — SIGNIFICANT CHANGE UP (ref 0–6)
EOSINOPHIL NFR BLD AUTO: 1.8 % — SIGNIFICANT CHANGE UP (ref 0–6)
FERRITIN SERPL-MCNC: 307 NG/ML — SIGNIFICANT CHANGE UP (ref 30–400)
FERRITIN SERPL-MCNC: 307 NG/ML — SIGNIFICANT CHANGE UP (ref 30–400)
GLUCOSE SERPL-MCNC: 206 MG/DL — HIGH (ref 70–99)
GLUCOSE SERPL-MCNC: 206 MG/DL — HIGH (ref 70–99)
HCT VFR BLD CALC: 42.9 % — SIGNIFICANT CHANGE UP (ref 39–50)
HCT VFR BLD CALC: 42.9 % — SIGNIFICANT CHANGE UP (ref 39–50)
HGB BLD-MCNC: 14.3 G/DL — SIGNIFICANT CHANGE UP (ref 13–17)
HGB BLD-MCNC: 14.3 G/DL — SIGNIFICANT CHANGE UP (ref 13–17)
IMM GRANULOCYTES NFR BLD AUTO: 0.1 % — SIGNIFICANT CHANGE UP (ref 0–0.9)
IMM GRANULOCYTES NFR BLD AUTO: 0.1 % — SIGNIFICANT CHANGE UP (ref 0–0.9)
IRON SATN MFR SERPL: 15 % — LOW (ref 16–55)
IRON SATN MFR SERPL: 15 % — LOW (ref 16–55)
IRON SATN MFR SERPL: 58 UG/DL — SIGNIFICANT CHANGE UP (ref 45–165)
IRON SATN MFR SERPL: 58 UG/DL — SIGNIFICANT CHANGE UP (ref 45–165)
LYMPHOCYTES # BLD AUTO: 2.46 K/UL — SIGNIFICANT CHANGE UP (ref 1–3.3)
LYMPHOCYTES # BLD AUTO: 2.46 K/UL — SIGNIFICANT CHANGE UP (ref 1–3.3)
LYMPHOCYTES # BLD AUTO: 34.3 % — SIGNIFICANT CHANGE UP (ref 13–44)
LYMPHOCYTES # BLD AUTO: 34.3 % — SIGNIFICANT CHANGE UP (ref 13–44)
MCHC RBC-ENTMCNC: 28.4 PG — SIGNIFICANT CHANGE UP (ref 27–34)
MCHC RBC-ENTMCNC: 28.4 PG — SIGNIFICANT CHANGE UP (ref 27–34)
MCHC RBC-ENTMCNC: 33.3 GM/DL — SIGNIFICANT CHANGE UP (ref 32–36)
MCHC RBC-ENTMCNC: 33.3 GM/DL — SIGNIFICANT CHANGE UP (ref 32–36)
MCV RBC AUTO: 85.3 FL — SIGNIFICANT CHANGE UP (ref 80–100)
MCV RBC AUTO: 85.3 FL — SIGNIFICANT CHANGE UP (ref 80–100)
MONOCYTES # BLD AUTO: 0.68 K/UL — SIGNIFICANT CHANGE UP (ref 0–0.9)
MONOCYTES # BLD AUTO: 0.68 K/UL — SIGNIFICANT CHANGE UP (ref 0–0.9)
MONOCYTES NFR BLD AUTO: 9.5 % — SIGNIFICANT CHANGE UP (ref 2–14)
MONOCYTES NFR BLD AUTO: 9.5 % — SIGNIFICANT CHANGE UP (ref 2–14)
NEUTROPHILS # BLD AUTO: 3.85 K/UL — SIGNIFICANT CHANGE UP (ref 1.8–7.4)
NEUTROPHILS # BLD AUTO: 3.85 K/UL — SIGNIFICANT CHANGE UP (ref 1.8–7.4)
NEUTROPHILS NFR BLD AUTO: 53.6 % — SIGNIFICANT CHANGE UP (ref 43–77)
NEUTROPHILS NFR BLD AUTO: 53.6 % — SIGNIFICANT CHANGE UP (ref 43–77)
PLATELET # BLD AUTO: 224 K/UL — SIGNIFICANT CHANGE UP (ref 150–400)
PLATELET # BLD AUTO: 224 K/UL — SIGNIFICANT CHANGE UP (ref 150–400)
POTASSIUM SERPL-MCNC: 4.4 MMOL/L — SIGNIFICANT CHANGE UP (ref 3.5–5.3)
POTASSIUM SERPL-MCNC: 4.4 MMOL/L — SIGNIFICANT CHANGE UP (ref 3.5–5.3)
POTASSIUM SERPL-SCNC: 4.4 MMOL/L — SIGNIFICANT CHANGE UP (ref 3.5–5.3)
POTASSIUM SERPL-SCNC: 4.4 MMOL/L — SIGNIFICANT CHANGE UP (ref 3.5–5.3)
PROT SERPL-MCNC: 7.8 G/DL — SIGNIFICANT CHANGE UP (ref 6–8.3)
PROT SERPL-MCNC: 7.8 G/DL — SIGNIFICANT CHANGE UP (ref 6–8.3)
RBC # BLD: 5.03 M/UL — SIGNIFICANT CHANGE UP (ref 4.2–5.8)
RBC # BLD: 5.03 M/UL — SIGNIFICANT CHANGE UP (ref 4.2–5.8)
RBC # FLD: 13.7 % — SIGNIFICANT CHANGE UP (ref 10.3–14.5)
RBC # FLD: 13.7 % — SIGNIFICANT CHANGE UP (ref 10.3–14.5)
SODIUM SERPL-SCNC: 140 MMOL/L — SIGNIFICANT CHANGE UP (ref 135–145)
SODIUM SERPL-SCNC: 140 MMOL/L — SIGNIFICANT CHANGE UP (ref 135–145)
TIBC SERPL-MCNC: 380 UG/DL — SIGNIFICANT CHANGE UP (ref 220–430)
TIBC SERPL-MCNC: 380 UG/DL — SIGNIFICANT CHANGE UP (ref 220–430)
UIBC SERPL-MCNC: 322 UG/DL — SIGNIFICANT CHANGE UP (ref 110–370)
UIBC SERPL-MCNC: 322 UG/DL — SIGNIFICANT CHANGE UP (ref 110–370)
WBC # BLD: 7.18 K/UL — SIGNIFICANT CHANGE UP (ref 3.8–10.5)
WBC # BLD: 7.18 K/UL — SIGNIFICANT CHANGE UP (ref 3.8–10.5)
WBC # FLD AUTO: 7.18 K/UL — SIGNIFICANT CHANGE UP (ref 3.8–10.5)
WBC # FLD AUTO: 7.18 K/UL — SIGNIFICANT CHANGE UP (ref 3.8–10.5)

## 2024-01-09 PROCEDURE — 85025 COMPLETE CBC W/AUTO DIFF WBC: CPT

## 2024-01-09 PROCEDURE — 82728 ASSAY OF FERRITIN: CPT

## 2024-01-09 PROCEDURE — 86038 ANTINUCLEAR ANTIBODIES: CPT

## 2024-01-09 PROCEDURE — 83540 ASSAY OF IRON: CPT

## 2024-01-09 PROCEDURE — 82784 ASSAY IGA/IGD/IGG/IGM EACH: CPT

## 2024-01-09 PROCEDURE — 87340 HEPATITIS B SURFACE AG IA: CPT

## 2024-01-09 PROCEDURE — 36415 COLL VENOUS BLD VENIPUNCTURE: CPT

## 2024-01-09 PROCEDURE — 86704 HEP B CORE ANTIBODY TOTAL: CPT

## 2024-01-09 PROCEDURE — 86803 HEPATITIS C AB TEST: CPT

## 2024-01-09 PROCEDURE — 80053 COMPREHEN METABOLIC PANEL: CPT

## 2024-01-09 PROCEDURE — 86364 TISS TRNSGLTMNASE EA IG CLAS: CPT

## 2024-01-09 PROCEDURE — G0463: CPT

## 2024-01-09 PROCEDURE — 99204 OFFICE O/P NEW MOD 45 MIN: CPT | Mod: GC

## 2024-01-09 PROCEDURE — 86255 FLUORESCENT ANTIBODY SCREEN: CPT

## 2024-01-09 PROCEDURE — 82390 ASSAY OF CERULOPLASMIN: CPT

## 2024-01-09 PROCEDURE — 83550 IRON BINDING TEST: CPT

## 2024-01-09 PROCEDURE — 86706 HEP B SURFACE ANTIBODY: CPT

## 2024-01-10 LAB
HBV CORE AB SER-ACNC: SIGNIFICANT CHANGE UP
HBV CORE AB SER-ACNC: SIGNIFICANT CHANGE UP
HBV SURFACE AB SER-ACNC: REACTIVE
HBV SURFACE AB SER-ACNC: REACTIVE
HBV SURFACE AG SER-ACNC: SIGNIFICANT CHANGE UP
HBV SURFACE AG SER-ACNC: SIGNIFICANT CHANGE UP
HCV AB S/CO SERPL IA: 0.12 S/CO — SIGNIFICANT CHANGE UP (ref 0–0.99)
HCV AB S/CO SERPL IA: 0.12 S/CO — SIGNIFICANT CHANGE UP (ref 0–0.99)
HCV AB SERPL-IMP: SIGNIFICANT CHANGE UP
HCV AB SERPL-IMP: SIGNIFICANT CHANGE UP
IGA FLD-MCNC: 568 MG/DL — HIGH (ref 84–499)
IGG FLD-MCNC: 1027 MG/DL — SIGNIFICANT CHANGE UP (ref 610–1660)
IGG FLD-MCNC: 1027 MG/DL — SIGNIFICANT CHANGE UP (ref 610–1660)
IGM SERPL-MCNC: 65 MG/DL — SIGNIFICANT CHANGE UP (ref 35–242)
IGM SERPL-MCNC: 65 MG/DL — SIGNIFICANT CHANGE UP (ref 35–242)
KAPPA LC SER QL IFE: 2.27 MG/DL — HIGH (ref 0.33–1.94)
KAPPA LC SER QL IFE: 2.27 MG/DL — HIGH (ref 0.33–1.94)
KAPPA/LAMBDA FREE LIGHT CHAIN RATIO, SERUM: 1.15 RATIO — SIGNIFICANT CHANGE UP (ref 0.26–1.65)
KAPPA/LAMBDA FREE LIGHT CHAIN RATIO, SERUM: 1.15 RATIO — SIGNIFICANT CHANGE UP (ref 0.26–1.65)
LAMBDA LC SER QL IFE: 1.98 MG/DL — SIGNIFICANT CHANGE UP (ref 0.57–2.63)
LAMBDA LC SER QL IFE: 1.98 MG/DL — SIGNIFICANT CHANGE UP (ref 0.57–2.63)
SMOOTH MUSCLE AB SER-ACNC: SIGNIFICANT CHANGE UP
SMOOTH MUSCLE AB SER-ACNC: SIGNIFICANT CHANGE UP
TTG IGA SER-ACNC: <1.2 U/ML — SIGNIFICANT CHANGE UP
TTG IGA SER-ACNC: <1.2 U/ML — SIGNIFICANT CHANGE UP
TTG IGA SER-ACNC: NEGATIVE — SIGNIFICANT CHANGE UP
TTG IGA SER-ACNC: NEGATIVE — SIGNIFICANT CHANGE UP

## 2024-01-10 NOTE — PHYSICAL EXAM
[Alert] : alert [Sclera] : the sclera and conjunctiva were normal [Hearing Threshold Finger Rub Not [place]] : hearing was normal [Normal Appearance] : the appearance of the neck was normal [No Respiratory Distress] : no respiratory distress [Heart Rate And Rhythm] : heart rate was normal and rhythm regular [Abdomen Tenderness] : non-tender [No Masses] : no abdominal mass palpated [Abdomen Soft] : soft [Abnormal Walk] : normal gait [Oriented To Time, Place, And Person] : oriented to person, place, and time

## 2024-01-11 PROBLEM — R10.13 ABDOMINAL PAIN, EPIGASTRIC: Status: ACTIVE | Noted: 2024-01-11

## 2024-01-11 PROBLEM — Z87.898 FORMER CONSUMPTION OF ALCOHOL: Status: ACTIVE | Noted: 2024-01-11

## 2024-01-11 PROBLEM — Z78.9 NO FAMILY HISTORY OF COLORECTAL CANCER: Status: ACTIVE | Noted: 2024-01-11

## 2024-01-11 PROBLEM — K92.1 MELENA: Status: ACTIVE | Noted: 2024-01-11

## 2024-01-11 PROBLEM — E66.9 OBESITY, CLASS II, BMI 35-39.9: Status: ACTIVE | Noted: 2024-01-11

## 2024-01-11 PROBLEM — Z82.49 FAMILY HISTORY OF CARDIAC DISORDER: Status: ACTIVE | Noted: 2024-01-11

## 2024-01-11 LAB
ANA TITR SER: NEGATIVE — SIGNIFICANT CHANGE UP
ANA TITR SER: NEGATIVE — SIGNIFICANT CHANGE UP

## 2024-01-11 NOTE — REVIEW OF SYSTEMS
[Chest Pain] : chest pain [SOB on Exertion] : shortness of breath during exertion [Abdominal Pain] : abdominal pain [Bleeding] : bleeding [Fever] : no fever [Chills] : no chills [Palpitations] : no palpitations [Shortness Of Breath] : no shortness of breath [Vomiting] : no vomiting [Constipation] : no constipation [Skin Lesions] : no skin lesions [Confused] : no confusion

## 2024-01-11 NOTE — ASSESSMENT
[FreeTextEntry1] : Patient is a 54 yo M w/ PMHx hiatal hernia, HLD, pre-diabetes, anxiety, back pain, elevated transaminases presenting for evaluation of generalized abdominal pain x 2 months.   #Generalized abdominal pain. Patient states it started 2 months ago, although he has had ED visits for this pain in June 2023. CT A/P IVC was normal at this time. He had an EGD/colonoscopy a year ago for further evaluation of this pain as well as occasional episodes of blood per rectum which patient states is brought on by eating peppers. He states the results were only notable for a HH.   He notes epigastric pain radiating to the  back and dark red blood intermittnetly.  #Elevated transaminases: AST/ALT elevated to 78/140 in June 2023. On chart review, also elevated back in 2021. He has a hx of etoh use in the past, but denies current heavy use. No liver disease in family.   #Pre-DM2, obesity (BMI 35): Patient states he is on a diabetic agent but not sure which one  Plan - will plan to repeat EGD for evaluation of abdominal pain, however given his reported SOB and CP, he was advised to see cardiology prior to scheduling. Referral provided. - will obtain abdominal US for evaluation of pain and elevated transaminases - serologic w/u for elevated transaminases (rule out hepatitis, hemochromatosis, autoimmune hepatitis, etc.) sent, CMP - patient advised to bring records of prior GI w/u including EGD/colonoscopy as well as current medication list - obtain records from [facility]  RTC after cardiology appointment.  Rosalina Mcfarland PGY6 GI/Hepatology Fellow D/w Dr. Bryant

## 2024-01-12 DIAGNOSIS — R74.01 ELEVATION OF LEVELS OF LIVER TRANSAMINASE LEVELS: ICD-10-CM

## 2024-01-12 DIAGNOSIS — R10.13 EPIGASTRIC PAIN: ICD-10-CM

## 2024-01-12 DIAGNOSIS — K92.1 MELENA: ICD-10-CM

## 2024-01-12 DIAGNOSIS — E66.9 OBESITY, UNSPECIFIED: ICD-10-CM

## 2024-01-18 ENCOUNTER — APPOINTMENT (OUTPATIENT)
Dept: ULTRASOUND IMAGING | Facility: CLINIC | Age: 54
End: 2024-01-18

## 2024-06-12 NOTE — ED ADULT NURSE NOTE - CAS EDN DISCHARGE ASSESSMENT
Detail Level: Detailed Detail Level: Simple Alert and oriented to person, place and time/Patient baseline mental status/Awake

## 2024-06-20 ENCOUNTER — EMERGENCY (EMERGENCY)
Facility: HOSPITAL | Age: 54
LOS: 1 days | Discharge: HOME | End: 2024-06-20
Attending: STUDENT IN AN ORGANIZED HEALTH CARE EDUCATION/TRAINING PROGRAM | Admitting: PERSONAL EMERGENCY RESPONSE ATTENDANT
Payer: MEDICARE

## 2024-06-20 VITALS
OXYGEN SATURATION: 99 % | HEIGHT: 66 IN | SYSTOLIC BLOOD PRESSURE: 144 MMHG | WEIGHT: 179.9 LBS | TEMPERATURE: 98 F | HEART RATE: 92 BPM | DIASTOLIC BLOOD PRESSURE: 89 MMHG | RESPIRATION RATE: 16 BRPM

## 2024-06-20 VITALS
RESPIRATION RATE: 15 BRPM | DIASTOLIC BLOOD PRESSURE: 85 MMHG | SYSTOLIC BLOOD PRESSURE: 144 MMHG | HEART RATE: 68 BPM | TEMPERATURE: 98 F | OXYGEN SATURATION: 98 %

## 2024-06-20 LAB
ALBUMIN SERPL ELPH-MCNC: 4.3 G/DL — SIGNIFICANT CHANGE UP (ref 3.3–5)
ALP SERPL-CCNC: 86 U/L — SIGNIFICANT CHANGE UP (ref 40–120)
ALT FLD-CCNC: 193 U/L — HIGH (ref 4–41)
ANION GAP SERPL CALC-SCNC: 14 MMOL/L — SIGNIFICANT CHANGE UP (ref 7–14)
APPEARANCE UR: CLEAR — SIGNIFICANT CHANGE UP
AST SERPL-CCNC: 146 U/L — HIGH (ref 4–40)
BASE EXCESS BLDV CALC-SCNC: 2.1 MMOL/L — SIGNIFICANT CHANGE UP (ref -2–3)
BASE EXCESS BLDV CALC-SCNC: 5.7 MMOL/L — HIGH (ref -2–3)
BASOPHILS # BLD AUTO: 0.04 K/UL — SIGNIFICANT CHANGE UP (ref 0–0.2)
BASOPHILS NFR BLD AUTO: 0.5 % — SIGNIFICANT CHANGE UP (ref 0–2)
BILIRUB SERPL-MCNC: 0.4 MG/DL — SIGNIFICANT CHANGE UP (ref 0.2–1.2)
BILIRUB UR-MCNC: NEGATIVE — SIGNIFICANT CHANGE UP
BLOOD GAS VENOUS COMPREHENSIVE RESULT: SIGNIFICANT CHANGE UP
BLOOD GAS VENOUS COMPREHENSIVE RESULT: SIGNIFICANT CHANGE UP
BUN SERPL-MCNC: 17 MG/DL — SIGNIFICANT CHANGE UP (ref 7–23)
CALCIUM SERPL-MCNC: 9.2 MG/DL — SIGNIFICANT CHANGE UP (ref 8.4–10.5)
CHLORIDE BLDV-SCNC: 101 MMOL/L — SIGNIFICANT CHANGE UP (ref 96–108)
CHLORIDE BLDV-SCNC: 98 MMOL/L — SIGNIFICANT CHANGE UP (ref 96–108)
CHLORIDE SERPL-SCNC: 98 MMOL/L — SIGNIFICANT CHANGE UP (ref 98–107)
CO2 BLDV-SCNC: 30.8 MMOL/L — HIGH (ref 22–26)
CO2 BLDV-SCNC: 36.3 MMOL/L — HIGH (ref 22–26)
CO2 SERPL-SCNC: 24 MMOL/L — SIGNIFICANT CHANGE UP (ref 22–31)
COLOR SPEC: YELLOW — SIGNIFICANT CHANGE UP
CREAT SERPL-MCNC: 0.88 MG/DL — SIGNIFICANT CHANGE UP (ref 0.5–1.3)
DIFF PNL FLD: NEGATIVE — SIGNIFICANT CHANGE UP
EGFR: 104 ML/MIN/1.73M2 — SIGNIFICANT CHANGE UP
EOSINOPHIL # BLD AUTO: 0.11 K/UL — SIGNIFICANT CHANGE UP (ref 0–0.5)
EOSINOPHIL NFR BLD AUTO: 1.3 % — SIGNIFICANT CHANGE UP (ref 0–6)
GAS PNL BLDV: 133 MMOL/L — LOW (ref 136–145)
GAS PNL BLDV: 135 MMOL/L — LOW (ref 136–145)
GLUCOSE BLDV-MCNC: 175 MG/DL — HIGH (ref 70–99)
GLUCOSE BLDV-MCNC: 236 MG/DL — HIGH (ref 70–99)
GLUCOSE SERPL-MCNC: 203 MG/DL — HIGH (ref 70–99)
GLUCOSE UR QL: >=1000 MG/DL
HCO3 BLDV-SCNC: 29 MMOL/L — SIGNIFICANT CHANGE UP (ref 22–29)
HCO3 BLDV-SCNC: 34 MMOL/L — HIGH (ref 22–29)
HCT VFR BLD CALC: 43.9 % — SIGNIFICANT CHANGE UP (ref 39–50)
HCT VFR BLDA CALC: 42 % — SIGNIFICANT CHANGE UP (ref 39–51)
HCT VFR BLDA CALC: 48 % — SIGNIFICANT CHANGE UP (ref 39–51)
HGB BLD CALC-MCNC: 14 G/DL — SIGNIFICANT CHANGE UP (ref 12.6–17.4)
HGB BLD CALC-MCNC: 16 G/DL — SIGNIFICANT CHANGE UP (ref 12.6–17.4)
HGB BLD-MCNC: 15.6 G/DL — SIGNIFICANT CHANGE UP (ref 13–17)
IANC: 5.15 K/UL — SIGNIFICANT CHANGE UP (ref 1.8–7.4)
IMM GRANULOCYTES NFR BLD AUTO: 0.2 % — SIGNIFICANT CHANGE UP (ref 0–0.9)
KETONES UR-MCNC: NEGATIVE MG/DL — SIGNIFICANT CHANGE UP
LACTATE BLDV-MCNC: 2 MMOL/L — SIGNIFICANT CHANGE UP (ref 0.5–2)
LACTATE BLDV-MCNC: 2.8 MMOL/L — HIGH (ref 0.5–2)
LEUKOCYTE ESTERASE UR-ACNC: NEGATIVE — SIGNIFICANT CHANGE UP
LIDOCAIN IGE QN: 70 U/L — HIGH (ref 7–60)
LYMPHOCYTES # BLD AUTO: 2.4 K/UL — SIGNIFICANT CHANGE UP (ref 1–3.3)
LYMPHOCYTES # BLD AUTO: 28.7 % — SIGNIFICANT CHANGE UP (ref 13–44)
MCHC RBC-ENTMCNC: 29.6 PG — SIGNIFICANT CHANGE UP (ref 27–34)
MCHC RBC-ENTMCNC: 35.5 GM/DL — SIGNIFICANT CHANGE UP (ref 32–36)
MCV RBC AUTO: 83.3 FL — SIGNIFICANT CHANGE UP (ref 80–100)
MONOCYTES # BLD AUTO: 0.63 K/UL — SIGNIFICANT CHANGE UP (ref 0–0.9)
MONOCYTES NFR BLD AUTO: 7.5 % — SIGNIFICANT CHANGE UP (ref 2–14)
NEUTROPHILS # BLD AUTO: 5.15 K/UL — SIGNIFICANT CHANGE UP (ref 1.8–7.4)
NEUTROPHILS NFR BLD AUTO: 61.8 % — SIGNIFICANT CHANGE UP (ref 43–77)
NITRITE UR-MCNC: NEGATIVE — SIGNIFICANT CHANGE UP
NRBC # BLD: 0 /100 WBCS — SIGNIFICANT CHANGE UP (ref 0–0)
NRBC # FLD: 0 K/UL — SIGNIFICANT CHANGE UP (ref 0–0)
PCO2 BLDV: 54 MMHG — SIGNIFICANT CHANGE UP (ref 42–55)
PCO2 BLDV: 65 MMHG — HIGH (ref 42–55)
PH BLDV: 7.33 — SIGNIFICANT CHANGE UP (ref 7.32–7.43)
PH BLDV: 7.34 — SIGNIFICANT CHANGE UP (ref 7.32–7.43)
PH UR: 6.5 — SIGNIFICANT CHANGE UP (ref 5–8)
PLATELET # BLD AUTO: 242 K/UL — SIGNIFICANT CHANGE UP (ref 150–400)
PO2 BLDV: 23 MMHG — LOW (ref 25–45)
PO2 BLDV: 29 MMHG — SIGNIFICANT CHANGE UP (ref 25–45)
POTASSIUM BLDV-SCNC: 3.7 MMOL/L — SIGNIFICANT CHANGE UP (ref 3.5–5.1)
POTASSIUM BLDV-SCNC: 4.4 MMOL/L — SIGNIFICANT CHANGE UP (ref 3.5–5.1)
POTASSIUM SERPL-MCNC: 4.3 MMOL/L — SIGNIFICANT CHANGE UP (ref 3.5–5.3)
POTASSIUM SERPL-SCNC: 4.3 MMOL/L — SIGNIFICANT CHANGE UP (ref 3.5–5.3)
PROT SERPL-MCNC: 7.4 G/DL — SIGNIFICANT CHANGE UP (ref 6–8.3)
PROT UR-MCNC: NEGATIVE MG/DL — SIGNIFICANT CHANGE UP
RBC # BLD: 5.27 M/UL — SIGNIFICANT CHANGE UP (ref 4.2–5.8)
RBC # FLD: 13.7 % — SIGNIFICANT CHANGE UP (ref 10.3–14.5)
SAO2 % BLDV: 27 % — LOW (ref 67–88)
SAO2 % BLDV: 42.5 % — LOW (ref 67–88)
SODIUM SERPL-SCNC: 136 MMOL/L — SIGNIFICANT CHANGE UP (ref 135–145)
SP GR SPEC: 1.02 — SIGNIFICANT CHANGE UP (ref 1–1.03)
TROPONIN T, HIGH SENSITIVITY RESULT: <6 NG/L — SIGNIFICANT CHANGE UP
UROBILINOGEN FLD QL: 1 MG/DL — SIGNIFICANT CHANGE UP (ref 0.2–1)
WBC # BLD: 8.35 K/UL — SIGNIFICANT CHANGE UP (ref 3.8–10.5)
WBC # FLD AUTO: 8.35 K/UL — SIGNIFICANT CHANGE UP (ref 3.8–10.5)

## 2024-06-20 PROCEDURE — 74177 CT ABD & PELVIS W/CONTRAST: CPT | Mod: 26,MC

## 2024-06-20 PROCEDURE — 93010 ELECTROCARDIOGRAM REPORT: CPT

## 2024-06-20 PROCEDURE — 99285 EMERGENCY DEPT VISIT HI MDM: CPT

## 2024-06-20 PROCEDURE — 71046 X-RAY EXAM CHEST 2 VIEWS: CPT | Mod: 26

## 2024-06-20 RX ORDER — SODIUM CHLORIDE 9 MG/ML
1000 INJECTION, SOLUTION INTRAVENOUS ONCE
Refills: 0 | Status: COMPLETED | OUTPATIENT
Start: 2024-06-20 | End: 2024-06-20

## 2024-06-20 RX ORDER — FAMOTIDINE 10 MG/ML
20 INJECTION INTRAVENOUS ONCE
Refills: 0 | Status: COMPLETED | OUTPATIENT
Start: 2024-06-20 | End: 2024-06-20

## 2024-06-20 RX ORDER — KETOROLAC TROMETHAMINE 30 MG/ML
15 SYRINGE (ML) INJECTION ONCE
Refills: 0 | Status: DISCONTINUED | OUTPATIENT
Start: 2024-06-20 | End: 2024-06-20

## 2024-06-20 RX ORDER — HALOPERIDOL DECANOATE 100 MG/ML
2.5 INJECTION INTRAMUSCULAR ONCE
Refills: 0 | Status: COMPLETED | OUTPATIENT
Start: 2024-06-20 | End: 2024-06-20

## 2024-06-20 RX ORDER — ACETAMINOPHEN 500 MG
1000 TABLET ORAL ONCE
Refills: 0 | Status: COMPLETED | OUTPATIENT
Start: 2024-06-20 | End: 2024-06-20

## 2024-06-20 RX ORDER — ONDANSETRON 8 MG/1
4 TABLET, FILM COATED ORAL ONCE
Refills: 0 | Status: COMPLETED | OUTPATIENT
Start: 2024-06-20 | End: 2024-06-20

## 2024-06-20 RX ADMIN — HALOPERIDOL DECANOATE 2.5 MILLIGRAM(S): 100 INJECTION INTRAMUSCULAR at 18:28

## 2024-06-20 RX ADMIN — Medication 30 MILLILITER(S): at 13:50

## 2024-06-20 RX ADMIN — FAMOTIDINE 20 MILLIGRAM(S): 10 INJECTION INTRAVENOUS at 13:50

## 2024-06-20 RX ADMIN — Medication 15 MILLIGRAM(S): at 17:13

## 2024-06-20 RX ADMIN — SODIUM CHLORIDE 1000 MILLILITER(S): 9 INJECTION, SOLUTION INTRAVENOUS at 13:49

## 2024-06-20 RX ADMIN — Medication 400 MILLIGRAM(S): at 17:12

## 2024-06-20 RX ADMIN — ONDANSETRON 4 MILLIGRAM(S): 8 TABLET, FILM COATED ORAL at 13:50

## 2024-06-20 NOTE — ED PROVIDER NOTE - CLINICAL SUMMARY MEDICAL DECISION MAKING FREE TEXT BOX
54yo M, hx of DM and depression, presents with chronic abdominal pain and new chest tightness. Vitals nonactionable. Physical exam significant for mildly diffusely tender to palpation. Concern for abdominal pathology such as pancreatitis, cholecystitis, gastritis, will give GI cocktail, pain meds, screening labs, reassess

## 2024-06-20 NOTE — ED PROVIDER NOTE - NSFOLLOWUPCLINICS_GEN_ALL_ED_FT
Gastroenterology at [facility]  Gastroenterology  [address] [place] [address]  Phone: [phone]  Fax:     [facility]  Gastroenterology  [address] [place] [address]  Phone: [phone]  Fax:     [facility] Gastroenterology  Gastroenterology  [address]  [place] [address]  Phone: [phone]  Fax:

## 2024-06-20 NOTE — ED PROVIDER NOTE - ATTENDING CONTRIBUTION TO CARE
I have personally performed a face to face medical and diagnostic evaluation of the patient. I have discussed with and reviewed the Resident's note and agree with the History, ROS, Physical Exam and MDM unless otherwise indicated. A brief summary of my personal evaluation and impression can be found below.    Macario BRYANT: 53-year-old male history of depression and diabetes on metformin presents with a chief complaint of diffuse abdominal pain and chest tightness, patient reports he has been dealing with abdominal pain and intermittent chest tightness for some time he reports both of which are getting worse over the last few days, reports was in the ED a year ago for similar symptoms, had blood work and imaging that was otherwise nonactionable.  Was to follow up with GI however got an appointment due to inadequate follow-up.  Having some nausea but no vomiting no fevers daily bowel movements but is urinating, patient also reports because of his abdominal pain it is hard for him to sustain an erection for intercourse.  No rashes.  He can move everything and feel everything.  Denies excessive alcohol use denies recreational drugs.    All other ROS negative, except as above and as per HPI and ROS section.    VITALS: Initial triage and subsequent vitals have been reviewed by me.  GEN APPEARANCE: Alert, non-toxic, well-appearing, NAD.  HEAD: Atraumatic.  EYES: PERRLa, EOMI, vision grossly intact.   NECK: Supple  CV: RRR, S1S2, no c/r/m/g. Cap refill <2 seconds. No bruits.   LUNGS: CTAB. No abnormal breath sounds.  ABDOMEN: diffuse abdominal tenderness   MSK/EXT: No spinal or extremity point tenderness. No CVA ttp. Pelvis stable. No peripheral edema.  NEURO: Alert, follows commands. Weight bearing normal. Speech normal. Sensation and motor normal x4 extremities.   SKIN: Warm, dry and intact. No rash.  PSYCH: Appropriate    Plan/MDM: exam vss non toxic PE as above ddx unclear etiology of pts symptoms though abdominal pain appears chronic, likewise CP appears to be acute on chronic, in no distress, abdomen noted for mild diffuse fullness and non focal tenderness, low c/f new acute intraabdominal surgical pathology , less c/f primary acs, presentation does not appear c/w dissection, pe, given presentation will check labs cxr ekg cardiacs ctap give meds as needed and reassess if studies are non actionable anticipate pt can follow up w pmd/gi.

## 2024-06-20 NOTE — ED PROVIDER NOTE - PATIENT PORTAL LINK FT
You can access the FollowMyHealth Patient Portal offered by [facility] by registering at the following website: [email or web address]. By joining [facility]’s FollowMyHealth portal, you will also be able to view your health information using other applications (apps) compatible with our system.

## 2024-06-20 NOTE — ED PROVIDER NOTE - PROGRESS NOTE DETAILS
Attending MD Lauren.  Pt signed out to me in stable condition pending CTAP, labs, reassess, refer for outpt f/u, 54 yo male with hx of DM on metformin, no prior surg hx with acute on chronic diffuse abd'l pain with new chest discomfort to L arm, intermittent  complaints, pain non-exertional, DM+, no marijuana. Stew PGY2: Pt was reassessed and is doing well. Results, including any incidental findings, were discussed. Follow up and return precautions were discussed. Patient verbalized understanding

## 2024-06-20 NOTE — ED ADULT NURSE NOTE - SUICIDE SCREENING DEPRESSION
[FreeTextEntry1] : Plan as above.\par \par The patient is still considering the Tdap and shingles vaccinations.\par \par The patient was advised to call for any problems or questions.\par \par Return visit for follow-up and flu vaccine in 6 weeks. Negative

## 2024-06-20 NOTE — ED PROVIDER NOTE - OBJECTIVE STATEMENT
52yo M, hx of DM and depression, presents with abdominal pain and chest tightness. Reports abdominal pain is diffuse and constant 10/10 with associated poor appetite and refractory to ibuprofen. Chest tightness began a few days ago and is also constant and nonexertional. No fevers, recent sickness or travel, nausea, vomiting. Still having daily bowel movements and urinating but says has to strain to do both.

## 2024-06-20 NOTE — ED PROVIDER NOTE - NSFOLLOWUPINSTRUCTIONS_ED_ALL_ED_FT
You were seen in the Emergency Department for abdominal and chest pain. Your EKG was nonactionable. Your blood work and imaging also were nonactionable. You were given the GI cocktail and antiinflammatory medications for pain. You also were given a low dose haldol which you say has improved your pain. Please follow up with the GI doctor for further management of your symptoms.    1) Continue all previously prescribed medications as directed.    2) Follow up with your primary care physician - take copies of your results.    3) Return to the Emergency Department for worsening or persistent symptoms, and/or ANY NEW OR CONCERNING SYMPTOMS. You were seen in the Emergency Department for abdominal and chest pain. Your EKG was nonactionable. Your blood work and imaging also were nonactionable. You were given the GI cocktail and antiinflammatory medications for pain. You also were given a low dose haldol which you say has improved your pain. Please follow up with the GI doctor for further management of your symptoms. To control your pain at home, you should take Ibuprofen 400 mg along with Tylenol 650mg-1000mg every 6 to 8 hours. Limit your maximum daily Tylenol from all sources to 4000mg. Be aware that many other medications contain acetaminophen which is also known as Tylenol. Taking Tylenol and Ibuprofen together has been shown to be more effective at relieving pain than taking them separately. These are both over the counter medications that you can  at your local pharmacy without a prescription. You need to respect all of the warnings on the bottles. You shouldn’t take these medications for more than a week without following up with your doctor. Both medications come with certain risks and side effects that you need to discuss with your doctor, especially if you are taking them for a prolonged period.    1) Continue all previously prescribed medications as directed.    2) Follow up with your primary care physician - take copies of your results.    3) Return to the Emergency Department for worsening or persistent symptoms, and/or ANY NEW OR CONCERNING SYMPTOMS. Improved

## 2024-06-20 NOTE — ED ADULT NURSE NOTE - NSFALLUNIVINTERV_ED_ALL_ED
Bed/Stretcher in lowest position, wheels locked, appropriate side rails in place/Call bell, personal items and telephone in reach/Instruct patient to call for assistance before getting out of bed/chair/stretcher/Non-slip footwear applied when patient is off stretcher/[place] to call system/Physically safe environment - no spills, clutter or unnecessary equipment/Purposeful proactive rounding/Room/bathroom lighting operational, light cord in reach

## 2024-06-20 NOTE — ED ADULT NURSE NOTE - OBJECTIVE STATEMENT
presents w abd pain radiating to chest x months. STs recently got worse. hx of GERD. no fever/chills. no blood in stool/urine. no dysuria. Hx of hernia. RR even and unlabored, speaking in complete sentences, ambulatory aao x 3.

## 2024-06-20 NOTE — ED PROVIDER NOTE - PHYSICAL EXAMINATION
Physical Exam:  Gen: NAD, AOx3, non-toxic appearing, able to ambulate without assistance  Head: NCAT  HEENT: EOMI, PEERLA, normal conjunctiva, tongue midline, oral mucosa moist  Lung: CTAB, no respiratory distress, no wheezes/rhonchi/rales B/L, speaking in full sentences  CV: RRR, no murmurs, rubs or gallops  Abd: soft, mildly tender diffusely, ND, no guarding, no rigidity, no rebound tenderness, no CVA tenderness   MSK: no visible deformities, ROM normal in UE/LE, no back pain  Neuro: No focal sensory or motor deficits  Skin: Warm, well perfused, no rash, no leg swelling  Psych: normal affect, calm

## 2024-06-20 NOTE — ED ADULT NURSE NOTE - DRUG PRE-SCREENING (DAST -1)
H&P reviewed  After examining the patient I find no changes in the patients condition since the H&P had been written      Vitals:    01/06/21 0708   BP: 135/94   Pulse: 98   Resp: 20   Temp: 99 5 °F (37 5 °C)   SpO2: 100% Statement Selected

## 2024-06-21 LAB
CULTURE RESULTS: NO GROWTH — SIGNIFICANT CHANGE UP
SPECIMEN SOURCE: SIGNIFICANT CHANGE UP

## 2024-08-06 ENCOUNTER — RESULT REVIEW (OUTPATIENT)
Age: 54
End: 2024-08-06

## 2024-08-06 ENCOUNTER — APPOINTMENT (OUTPATIENT)
Dept: GASTROENTEROLOGY | Facility: HOSPITAL | Age: 54
End: 2024-08-06

## 2024-08-06 ENCOUNTER — OUTPATIENT (OUTPATIENT)
Dept: OUTPATIENT SERVICES | Facility: HOSPITAL | Age: 54
LOS: 1 days | End: 2024-08-06
Payer: COMMERCIAL

## 2024-08-06 DIAGNOSIS — R10.9 UNSPECIFIED ABDOMINAL PAIN: ICD-10-CM

## 2024-08-06 PROBLEM — R68.81 EARLY SATIETY: Status: ACTIVE | Noted: 2024-08-06

## 2024-08-06 PROBLEM — R13.10 DYSPHAGIA: Status: ACTIVE | Noted: 2024-08-06

## 2024-08-06 PROBLEM — R11.0 NAUSEA: Status: ACTIVE | Noted: 2024-08-06

## 2024-08-06 PROBLEM — R19.4 CHANGE IN BOWEL HABITS: Status: ACTIVE | Noted: 2024-08-06

## 2024-08-06 PROBLEM — R10.84 GENERALIZED ABDOMINAL PAIN OF UNKNOWN ETIOLOGY: Status: ACTIVE | Noted: 2024-08-06

## 2024-08-06 PROCEDURE — 99213 OFFICE O/P EST LOW 20 MIN: CPT | Mod: GC

## 2024-08-06 PROCEDURE — G0463: CPT

## 2024-08-06 NOTE — PHYSICAL EXAM
[Alert] : alert [Healthy Appearing] : healthy appearing [No Acute Distress] : no acute distress [No Respiratory Distress] : no respiratory distress [Heart Rate And Rhythm] : heart rate was normal and rhythm regular [None] : no edema [Abdomen Tenderness] : non-tender [No Masses] : no abdominal mass palpated [Abdomen Soft] : soft [Normal Color / Pigmentation] : normal skin color and pigmentation [No Focal Deficits] : no focal deficits [Oriented To Time, Place, And Person] : oriented to person, place, and time [Normal Affect] : the affect was normal [de-identified] : dry cough

## 2024-08-06 NOTE — PHYSICAL EXAM
[Alert] : alert [Healthy Appearing] : healthy appearing [No Acute Distress] : no acute distress [No Respiratory Distress] : no respiratory distress [Heart Rate And Rhythm] : heart rate was normal and rhythm regular [None] : no edema [Abdomen Tenderness] : non-tender [No Masses] : no abdominal mass palpated [Abdomen Soft] : soft [Normal Color / Pigmentation] : normal skin color and pigmentation [No Focal Deficits] : no focal deficits [Oriented To Time, Place, And Person] : oriented to person, place, and time [Normal Affect] : the affect was normal [de-identified] : dry cough

## 2024-08-06 NOTE — HISTORY OF PRESENT ILLNESS
Infertility, female, primary    Ovarian cancer [FreeTextEntry1] : Patient is a 54 yo M w/ PMHx GERD, hiatal hernia, class 2 obesity, HLD, pre-diabetes (not on medications), depression/anxiety, back pain, elevated transaminases presenting for follow up of abdominal pain. Patient was evaluated in GI clinic in January '24 for abdominal pain with plan for EGD, Cardiology consultation, abdominal ultrasound and serologic work-up of elevated liver enzymes. Patient was advised to bring records of prior GI w/u including EGD/colonoscopy as well as current medication list.  Since last visit, he had a serologic work-up for abnormal LFTs which was negative including normal ferritin, ceruloplasmin, celiac screen, autoimmune hepatitis w/u, AMA. Hep B sAb reactive. Hep B surface Ag & core Ab nonreactive.   Patient describes a generalized abdominal pain that radiates around the abdomen. The pain is present every day from the moment he awakens until the moment he goes to sleep; the pain occasionally awakens him from sleep when turning. Severity of pain is worsening over time. Pain is somewhat improved by walking, worsened by eating, sitting down or lying down. There are no specific food triggers. The pain has radiated to his left butt cheek and then down his left leg, also up to the back of his neck and to his head. He develops a feeling of claustrophobia followed by SOB. When he sleeps on his right side, he has SOB necessitating him to turn onto his left side. When he eats, he feels mucus up his chest with expectoration. He feels his throat his tight and drinks water to move food down. When he drinks warm (not scalding) tea, he feels a burning in his chest. He noticed a change in his bowel movements last month, moving his bowels once daily with straining, previously moved his bowels 3-4 times daily. Appetite is decreased with early satiety. When he eats more, he has SOB. No known weight loss. No melena or hematochezia. A/w nausea without vomiting.    Presented to [facility] ED on 6/20 for abdominal pain. CT A/P with IV Contrast was normal. Labs were notable for elevated liver enzymes (, ), normal T Bili/ALP, normal CBC, lipase 70. CXR clear. Patient was treated with IV pain medication c/b SOB. He then presented to [facility] on 6/23 for the same with normal CBC, , AST 97, lipase 55, trop negative, normal CT A/P & CXR.  He then presented again to [facility] on 7/7 for the same with , AST 64, normal CXR. He was started on Miralax daily for one week which he took without any improvement in his bowel movements.   He previously established care at a GI clinic affiliated with [facility]. He previously thought that he underwent EGD/colonoscopy a year ago, however now recalls that the procedures were not performed. He believes he had an EGD/colonoscopy 5-7 years ago without notable findings.   He has no known family hx of GI issues or malignancy.  Former smoker, 3-5 cigarettes/day for 3-4 years, quit 25 years ago.  Alcohol consumption is 2 beers / month.  No illicit drug use including cocaine or IV drugs.  Patient is currently taking pantoprazole twice daily 30-60 minutes before meals (consistently for the past 7-8 years) with adequate control of heartburn.  He used to take Seroquel and Zoloft for depression, which he stopped last year. He has a Psychiatrist with whom he follows regularly, last visit was one month ago.  He used to take Metformin which was stopped due to pain.  No chest pain, orthopnea, leg swelling.  Employment: on disability, previously worked .

## 2024-08-06 NOTE — ASSESSMENT
[FreeTextEntry1] : Patient is a 52 yo M w/ PMHx GERD, hiatal hernia, class 2 obesity, HLD, pre-diabetes (not on medications), depression/anxiety, back pain, elevated transaminases presenting for follow up of abdominal pain.   1. Generalized abdominal pain, chronic 2. Early satiety 3. Dysphagia 4. Change in bowel habits 5. Elevated liver enzymes 6. GERD, on PPI twice daily  Patient with constant daily generalized abdominal pain for the past 8-10 months. Work-up has been notable for elevated liver enzymes (AST/ALT) with normal synthetic liver function, normal CBC, normal lipase, negative serologic evaluation of abnormal liver enzymes. He has had multiple normal CT Abdomen/Pelvis with IV Contrast and CXR. Troponin negative.  Abdomen is soft, non-tender and non-distended on exam.  No recent EGD or colonoscopy (believes he had both performed 5-7 years ago with normal findings). No bloody stools. Change in bowel habits noted, down to one BM daily with straining despite Miralax, previously 3-4 times daily. No improvement in pain with defecation. No significant tobacco, alcohol or drug use.  Normal ferritin, ceruloplasmin, celiac screen, autoimmune hepatitis w/u, AMA. Hep B sAb reactive. Hep B surface Ag & core Ab nonreactive. Hepatic synthetic function remains intact. No features of cirrhosis or portal hypertension on exam or CT imaging. Differential diagnosis remains broad including but not limited to gastroparesis, malignancy, mesenteric insufficiency, IBS-C.   Recommendations:  - Perform gastric emptying study to evaluate for gastroparesis - Perform US doppler of mesenteric vasculature to evaluate for mesenteric insufficiency - Perform EGD/colonoscopy with MiraLax daily for 1 week prior in addition to GoLytely bowel prep - RTC in 3-6 months after testing   Sung Johnson MD Gastroenterology/Hepatology Fellow

## 2024-08-06 NOTE — REVIEW OF SYSTEMS
[Feeling Poorly] : feeling poorly [Shortness Of Breath] : shortness of breath [Cough] : cough [Abdominal Pain] : abdominal pain [Recent Weight Loss (___ Lbs)] : no recent weight loss [Scleral Icterus (Yellow Eyes)] : no scleral icterus [Chest Pain] : no chest pain [Palpitations] : no palpitations [SOB on Exertion] : no shortness of breath during exertion [Vomiting] : no vomiting [Bleeding] : no bleeding [Rash] : no rash [Skin Lesions] : no skin lesions [Itching] : no itching [Jaundice (yellowing of skin)] : no jaundice

## 2024-08-06 NOTE — HISTORY OF PRESENT ILLNESS
[FreeTextEntry1] : Patient is a 52 yo M w/ PMHx GERD, hiatal hernia, class 2 obesity, HLD, pre-diabetes (not on medications), depression/anxiety, back pain, elevated transaminases presenting for follow up of abdominal pain. Patient was evaluated in GI clinic in January '24 for abdominal pain with plan for EGD, Cardiology consultation, abdominal ultrasound and serologic work-up of elevated liver enzymes. Patient was advised to bring records of prior GI w/u including EGD/colonoscopy as well as current medication list.  Since last visit, he had a serologic work-up for abnormal LFTs which was negative including normal ferritin, ceruloplasmin, celiac screen, autoimmune hepatitis w/u, AMA. Hep B sAb reactive. Hep B surface Ag & core Ab nonreactive.   Patient describes a generalized abdominal pain that radiates around the abdomen. The pain is present every day from the moment he awakens until the moment he goes to sleep; the pain occasionally awakens him from sleep when turning. Severity of pain is worsening over time. Pain is somewhat improved by walking, worsened by eating, sitting down or lying down. There are no specific food triggers. The pain has radiated to his left butt cheek and then down his left leg, also up to the back of his neck and to his head. He develops a feeling of claustrophobia followed by SOB. When he sleeps on his right side, he has SOB necessitating him to turn onto his left side. When he eats, he feels mucus up his chest with expectoration. He feels his throat his tight and drinks water to move food down. When he drinks warm (not scalding) tea, he feels a burning in his chest. He noticed a change in his bowel movements last month, moving his bowels once daily with straining, previously moved his bowels 3-4 times daily. Appetite is decreased with early satiety. When he eats more, he has SOB. No known weight loss. No melena or hematochezia. A/w nausea without vomiting.    Presented to [facility] ED on 6/20 for abdominal pain. CT A/P with IV Contrast was normal. Labs were notable for elevated liver enzymes (, ), normal T Bili/ALP, normal CBC, lipase 70. CXR clear. Patient was treated with IV pain medication c/b SOB. He then presented to [facility] [place] on 6/23 for the same with normal CBC, , AST 97, lipase 55, trop negative, normal CT A/P & CXR.  He then presented again to [facility] [place] on 7/7 for the same with , AST 64, normal CXR. He was started on Miralax daily for one week which he took without any improvement in his bowel movements.   He previously established care at a GI clinic affiliated with [facility]. He previously thought that he underwent EGD/colonoscopy a year ago, however now recalls that the procedures were not performed. He believes he had an EGD/colonoscopy 5-7 years ago without notable findings.   He has no known family hx of GI issues or malignancy.  Former smoker, 3-5 cigarettes/day for 3-4 years, quit 25 years ago.  Alcohol consumption is 2 beers / month.  No illicit drug use including cocaine or IV drugs.  Patient is currently taking pantoprazole twice daily 30-60 minutes before meals (consistently for the past 7-8 years) with adequate control of heartburn.  He used to take Seroquel and Zoloft for depression, which he stopped last year. He has a Psychiatrist with whom he follows regularly, last visit was one month ago.  He used to take Metformin which was stopped due to pain.  No chest pain, orthopnea, leg swelling.  Employment: on disability, previously worked .

## 2024-08-06 NOTE — ASSESSMENT
[FreeTextEntry1] : Patient is a 54 yo M w/ PMHx GERD, hiatal hernia, class 2 obesity, HLD, pre-diabetes (not on medications), depression/anxiety, back pain, elevated transaminases presenting for follow up of abdominal pain.   1. Generalized abdominal pain, chronic 2. Early satiety 3. Dysphagia 4. Change in bowel habits 5. Elevated liver enzymes 6. GERD, on PPI twice daily  Patient with constant daily generalized abdominal pain for the past 8-10 months. Work-up has been notable for elevated liver enzymes (AST/ALT) with normal synthetic liver function, normal CBC, normal lipase, negative serologic evaluation of abnormal liver enzymes. He has had multiple normal CT Abdomen/Pelvis with IV Contrast and CXR. Troponin negative.  Abdomen is soft, non-tender and non-distended on exam.  No recent EGD or colonoscopy (believes he had both performed 5-7 years ago with normal findings). No bloody stools. Change in bowel habits noted, down to one BM daily with straining despite Miralax, previously 3-4 times daily. No improvement in pain with defecation. No significant tobacco, alcohol or drug use.  Normal ferritin, ceruloplasmin, celiac screen, autoimmune hepatitis w/u, AMA. Hep B sAb reactive. Hep B surface Ag & core Ab nonreactive. Hepatic synthetic function remains intact. No features of cirrhosis or portal hypertension on exam or CT imaging. Differential diagnosis remains broad including but not limited to gastroparesis, malignancy, mesenteric insufficiency, IBS-C.   Recommendations:  - Perform gastric emptying study to evaluate for gastroparesis - Perform US doppler of mesenteric vasculature to evaluate for mesenteric insufficiency - Perform EGD/colonoscopy with MiraLax daily for 1 week prior in addition to GoLytely bowel prep - RTC in 3-6 months after testing   Sung Johnson MD Gastroenterology/Hepatology Fellow

## 2024-08-07 DIAGNOSIS — R13.10 DYSPHAGIA, UNSPECIFIED: ICD-10-CM

## 2024-08-07 DIAGNOSIS — R74.01 ELEVATION OF LEVELS OF LIVER TRANSAMINASE LEVELS: ICD-10-CM

## 2024-08-07 DIAGNOSIS — R11.0 NAUSEA: ICD-10-CM

## 2024-08-07 DIAGNOSIS — R10.84 GENERALIZED ABDOMINAL PAIN: ICD-10-CM

## 2024-08-07 DIAGNOSIS — R19.4 CHANGE IN BOWEL HABIT: ICD-10-CM

## 2024-08-07 DIAGNOSIS — R68.81 EARLY SATIETY: ICD-10-CM

## 2024-08-10 ENCOUNTER — APPOINTMENT (OUTPATIENT)
Dept: ULTRASOUND IMAGING | Facility: IMAGING CENTER | Age: 54
End: 2024-08-10

## 2024-08-10 ENCOUNTER — OUTPATIENT (OUTPATIENT)
Dept: OUTPATIENT SERVICES | Facility: HOSPITAL | Age: 54
LOS: 1 days | End: 2024-08-10
Payer: COMMERCIAL

## 2024-08-10 DIAGNOSIS — R10.84 GENERALIZED ABDOMINAL PAIN: ICD-10-CM

## 2024-08-10 PROCEDURE — 93975 VASCULAR STUDY: CPT | Mod: 26

## 2024-08-10 PROCEDURE — 93975 VASCULAR STUDY: CPT

## 2024-08-15 ENCOUNTER — RESULT REVIEW (OUTPATIENT)
Age: 54
End: 2024-08-15

## 2024-08-15 ENCOUNTER — TRANSCRIPTION ENCOUNTER (OUTPATIENT)
Age: 54
End: 2024-08-15

## 2024-08-19 ENCOUNTER — OUTPATIENT (OUTPATIENT)
Dept: OUTPATIENT SERVICES | Facility: HOSPITAL | Age: 54
LOS: 1 days | End: 2024-08-19

## 2024-08-19 ENCOUNTER — APPOINTMENT (OUTPATIENT)
Dept: NUCLEAR MEDICINE | Facility: HOSPITAL | Age: 54
End: 2024-08-19
Payer: MEDICARE

## 2024-08-19 DIAGNOSIS — R11.0 NAUSEA: ICD-10-CM

## 2024-08-19 PROCEDURE — 78264 GASTRIC EMPTYING IMG STUDY: CPT | Mod: 26

## 2024-09-04 RX ORDER — AMITRIPTYLINE HYDROCHLORIDE 10 MG/1
10 TABLET, FILM COATED ORAL
Qty: 30 | Refills: 0 | Status: ACTIVE | COMMUNITY
Start: 2024-09-04 | End: 1900-01-01

## 2024-12-17 ENCOUNTER — APPOINTMENT (OUTPATIENT)
Dept: GASTROENTEROLOGY | Facility: HOSPITAL | Age: 54
End: 2024-12-17